# Patient Record
Sex: MALE | Race: WHITE | NOT HISPANIC OR LATINO | Employment: FULL TIME | URBAN - METROPOLITAN AREA
[De-identification: names, ages, dates, MRNs, and addresses within clinical notes are randomized per-mention and may not be internally consistent; named-entity substitution may affect disease eponyms.]

---

## 2023-09-23 PROCEDURE — 99285 EMERGENCY DEPT VISIT HI MDM: CPT

## 2023-09-24 ENCOUNTER — HOSPITAL ENCOUNTER (INPATIENT)
Facility: HOSPITAL | Age: 28
LOS: 2 days | Discharge: HOME | DRG: 274 | End: 2023-09-26
Attending: EMERGENCY MEDICINE | Admitting: INTERNAL MEDICINE
Payer: COMMERCIAL

## 2023-09-24 ENCOUNTER — APPOINTMENT (OUTPATIENT)
Dept: GENERAL RADIOLOGY | Facility: HOSPITAL | Age: 28
DRG: 274 | End: 2023-09-24
Payer: COMMERCIAL

## 2023-09-24 ENCOUNTER — APPOINTMENT (OUTPATIENT)
Dept: CARDIOLOGY | Facility: HOSPITAL | Age: 28
DRG: 274 | End: 2023-09-24
Payer: COMMERCIAL

## 2023-09-24 DIAGNOSIS — I45.6 WOLFF-PARKINSON-WHITE SYNDROME: ICD-10-CM

## 2023-09-24 DIAGNOSIS — I47.20 V-TACH: Primary | ICD-10-CM

## 2023-09-24 DIAGNOSIS — I48.91 ATRIAL FIBRILLATION WITH RVR: ICD-10-CM

## 2023-09-24 LAB
ALBUMIN SERPL-MCNC: 4.2 G/DL (ref 3.5–5.2)
ALBUMIN SERPL-MCNC: 4.9 G/DL (ref 3.5–5.2)
ALBUMIN/GLOB SERPL: 2 G/DL
ALBUMIN/GLOB SERPL: 2.1 G/DL
ALP SERPL-CCNC: 59 U/L (ref 39–117)
ALP SERPL-CCNC: 77 U/L (ref 39–117)
ALT SERPL W P-5'-P-CCNC: 17 U/L (ref 1–41)
ALT SERPL W P-5'-P-CCNC: 17 U/L (ref 1–41)
ANION GAP SERPL CALCULATED.3IONS-SCNC: 12 MMOL/L (ref 5–15)
ANION GAP SERPL CALCULATED.3IONS-SCNC: 15 MMOL/L (ref 5–15)
ANION GAP SERPL CALCULATED.3IONS-SCNC: 17 MMOL/L (ref 10–20)
APTT PPP: 27.2 SECONDS (ref 61–76.5)
AST SERPL-CCNC: 21 U/L (ref 1–40)
AST SERPL-CCNC: 23 U/L (ref 1–40)
BASOPHILS # BLD AUTO: 0 10*3/MM3 (ref 0–0.2)
BASOPHILS # BLD AUTO: 0.1 10*3/MM3 (ref 0–0.2)
BASOPHILS NFR BLD AUTO: 0.5 % (ref 0–1.5)
BASOPHILS NFR BLD AUTO: 1.3 % (ref 0–1.5)
BH CV ECHO MEAS - AO MAX PG: 9.6 MMHG
BH CV ECHO MEAS - AO MEAN PG: 5 MMHG
BH CV ECHO MEAS - AO V2 MAX: 155 CM/SEC
BH CV ECHO MEAS - AO V2 VTI: 22.8 CM
BH CV ECHO MEAS - AVA(I,D): 2.8 CM2
BH CV ECHO MEAS - EDV(CUBED): 79.5 ML
BH CV ECHO MEAS - EDV(MOD-SP4): 77.6 ML
BH CV ECHO MEAS - EF(MOD-BP): 43 %
BH CV ECHO MEAS - EF(MOD-SP4): 43.2 %
BH CV ECHO MEAS - ESV(CUBED): 35.9 ML
BH CV ECHO MEAS - ESV(MOD-SP4): 44.1 ML
BH CV ECHO MEAS - FS: 23.3 %
BH CV ECHO MEAS - IVS/LVPW: 1 CM
BH CV ECHO MEAS - IVSD: 0.8 CM
BH CV ECHO MEAS - LA DIMENSION: 3.4 CM
BH CV ECHO MEAS - LAT PEAK E' VEL: 17 CM/SEC
BH CV ECHO MEAS - LV DIASTOLIC VOL/BSA (35-75): 31.1 CM2
BH CV ECHO MEAS - LV MASS(C)D: 105.3 GRAMS
BH CV ECHO MEAS - LV MAX PG: 3.9 MMHG
BH CV ECHO MEAS - LV MEAN PG: 2 MMHG
BH CV ECHO MEAS - LV SYSTOLIC VOL/BSA (12-30): 17.7 CM2
BH CV ECHO MEAS - LV V1 MAX: 98.4 CM/SEC
BH CV ECHO MEAS - LV V1 VTI: 14.2 CM
BH CV ECHO MEAS - LVIDD: 4.3 CM
BH CV ECHO MEAS - LVIDS: 3.3 CM
BH CV ECHO MEAS - LVOT AREA: 4.5 CM2
BH CV ECHO MEAS - LVOT DIAM: 2.4 CM
BH CV ECHO MEAS - LVPWD: 0.8 CM
BH CV ECHO MEAS - MED PEAK E' VEL: 4.8 CM/SEC
BH CV ECHO MEAS - MV A MAX VEL: 48.7 CM/SEC
BH CV ECHO MEAS - MV DEC SLOPE: 1371 CM/SEC2
BH CV ECHO MEAS - MV DEC TIME: 0.11 SEC
BH CV ECHO MEAS - MV E MAX VEL: 82.3 CM/SEC
BH CV ECHO MEAS - MV E/A: 1.69
BH CV ECHO MEAS - MV MAX PG: 4.4 MMHG
BH CV ECHO MEAS - MV MEAN PG: 2 MMHG
BH CV ECHO MEAS - MV P1/2T: 22.4 MSEC
BH CV ECHO MEAS - MV V2 VTI: 12.2 CM
BH CV ECHO MEAS - MVA(P1/2T): 9.8 CM2
BH CV ECHO MEAS - MVA(VTI): 5.3 CM2
BH CV ECHO MEAS - PA V2 MAX: 133 CM/SEC
BH CV ECHO MEAS - RV MAX PG: 3.1 MMHG
BH CV ECHO MEAS - RV V1 MAX: 88.6 CM/SEC
BH CV ECHO MEAS - RV V1 VTI: 18.1 CM
BH CV ECHO MEAS - RVDD: 3.4 CM
BH CV ECHO MEAS - SI(MOD-SP4): 13.4 ML/M2
BH CV ECHO MEAS - SV(LVOT): 64.2 ML
BH CV ECHO MEAS - SV(MOD-SP4): 33.5 ML
BH CV ECHO MEAS - TAPSE (>1.6): 1.41 CM
BH CV ECHO MEASUREMENTS AVERAGE E/E' RATIO: 7.55
BH CV XLRA - TDI S': 16.9 CM/SEC
BILIRUB SERPL-MCNC: 0.4 MG/DL (ref 0–1.2)
BILIRUB SERPL-MCNC: 1.2 MG/DL (ref 0–1.2)
BUN BLDA-MCNC: 23 MG/DL (ref 8–26)
BUN SERPL-MCNC: 15 MG/DL (ref 6–20)
BUN SERPL-MCNC: 17 MG/DL (ref 6–20)
BUN/CREAT SERPL: 13.8 (ref 7–25)
BUN/CREAT SERPL: 17 (ref 7–25)
CA-I BLDA-SCNC: 1.1 MMOL/L (ref 1.12–1.32)
CALCIUM SPEC-SCNC: 9.1 MG/DL (ref 8.6–10.5)
CALCIUM SPEC-SCNC: 9.6 MG/DL (ref 8.6–10.5)
CHLORIDE BLDA-SCNC: 105 MMOL/L (ref 98–109)
CHLORIDE SERPL-SCNC: 105 MMOL/L (ref 98–107)
CHLORIDE SERPL-SCNC: 109 MMOL/L (ref 98–107)
CHOLEST SERPL-MCNC: 157 MG/DL (ref 0–200)
CO2 BLDA-SCNC: 24 MMOL/L (ref 24–29)
CO2 SERPL-SCNC: 20 MMOL/L (ref 22–29)
CO2 SERPL-SCNC: 22 MMOL/L (ref 22–29)
CREAT BLDA-MCNC: 1.6 MG/DL (ref 0.6–1.3)
CREAT SERPL-MCNC: 0.88 MG/DL (ref 0.76–1.27)
CREAT SERPL-MCNC: 1.23 MG/DL (ref 0.76–1.27)
DEPRECATED RDW RBC AUTO: 42.9 FL (ref 37–54)
DEPRECATED RDW RBC AUTO: 43.3 FL (ref 37–54)
EGFRCR SERPLBLD CKD-EPI 2021: 119.4 ML/MIN/1.73
EGFRCR SERPLBLD CKD-EPI 2021: 59.4 ML/MIN/1.73
EGFRCR SERPLBLD CKD-EPI 2021: 81.5 ML/MIN/1.73
EOSINOPHIL # BLD AUTO: 0 10*3/MM3 (ref 0–0.4)
EOSINOPHIL # BLD AUTO: 0.1 10*3/MM3 (ref 0–0.4)
EOSINOPHIL NFR BLD AUTO: 0.7 % (ref 0.3–6.2)
EOSINOPHIL NFR BLD AUTO: 0.8 % (ref 0.3–6.2)
ERYTHROCYTE [DISTWIDTH] IN BLOOD BY AUTOMATED COUNT: 13.1 % (ref 12.3–15.4)
ERYTHROCYTE [DISTWIDTH] IN BLOOD BY AUTOMATED COUNT: 13.2 % (ref 12.3–15.4)
ETHANOL UR QL: 0.12 %
GLOBULIN UR ELPH-MCNC: 2.1 GM/DL
GLOBULIN UR ELPH-MCNC: 2.3 GM/DL
GLUCOSE BLDC GLUCOMTR-MCNC: 121 MG/DL (ref 70–105)
GLUCOSE SERPL-MCNC: 101 MG/DL (ref 65–99)
GLUCOSE SERPL-MCNC: 110 MG/DL (ref 65–99)
HCT VFR BLD AUTO: 44.4 % (ref 37.5–51)
HCT VFR BLD AUTO: 46.7 % (ref 37.5–51)
HCT VFR BLDA CALC: 46 % (ref 38–51)
HDLC SERPL-MCNC: 41 MG/DL (ref 40–60)
HGB BLD-MCNC: 15.6 G/DL (ref 13–17.7)
HGB BLD-MCNC: 16.7 G/DL (ref 13–17.7)
HGB BLDA-MCNC: 15.6 G/DL (ref 12–17)
INR PPP: 0.96 (ref 0.93–1.1)
LDLC SERPL CALC-MCNC: 97 MG/DL (ref 0–100)
LDLC/HDLC SERPL: 2.32 {RATIO}
LEFT ATRIUM VOLUME INDEX: 22 ML/M2
LYMPHOCYTES # BLD AUTO: 2.1 10*3/MM3 (ref 0.7–3.1)
LYMPHOCYTES # BLD AUTO: 2.1 10*3/MM3 (ref 0.7–3.1)
LYMPHOCYTES NFR BLD AUTO: 25.2 % (ref 19.6–45.3)
LYMPHOCYTES NFR BLD AUTO: 31 % (ref 19.6–45.3)
MAGNESIUM SERPL-MCNC: 2 MG/DL (ref 1.6–2.6)
MAGNESIUM SERPL-MCNC: 2.3 MG/DL (ref 1.6–2.6)
MCH RBC QN AUTO: 31.4 PG (ref 26.6–33)
MCH RBC QN AUTO: 32 PG (ref 26.6–33)
MCHC RBC AUTO-ENTMCNC: 35.1 G/DL (ref 31.5–35.7)
MCHC RBC AUTO-ENTMCNC: 35.9 G/DL (ref 31.5–35.7)
MCV RBC AUTO: 89.1 FL (ref 79–97)
MCV RBC AUTO: 89.4 FL (ref 79–97)
MONOCYTES # BLD AUTO: 0.5 10*3/MM3 (ref 0.1–0.9)
MONOCYTES # BLD AUTO: 0.9 10*3/MM3 (ref 0.1–0.9)
MONOCYTES NFR BLD AUTO: 10.4 % (ref 5–12)
MONOCYTES NFR BLD AUTO: 7.8 % (ref 5–12)
NEUTROPHILS NFR BLD AUTO: 4.1 10*3/MM3 (ref 1.7–7)
NEUTROPHILS NFR BLD AUTO: 5.3 10*3/MM3 (ref 1.7–7)
NEUTROPHILS NFR BLD AUTO: 60 % (ref 42.7–76)
NEUTROPHILS NFR BLD AUTO: 62.3 % (ref 42.7–76)
NRBC BLD AUTO-RTO: 0.1 /100 WBC (ref 0–0.2)
NRBC BLD AUTO-RTO: 0.1 /100 WBC (ref 0–0.2)
PLATELET # BLD AUTO: 213 10*3/MM3 (ref 140–450)
PLATELET # BLD AUTO: 253 10*3/MM3 (ref 140–450)
PMV BLD AUTO: 7.7 FL (ref 6–12)
PMV BLD AUTO: 7.8 FL (ref 6–12)
POTASSIUM BLDA-SCNC: 4.3 MMOL/L (ref 3.5–4.9)
POTASSIUM SERPL-SCNC: 4 MMOL/L (ref 3.5–5.2)
POTASSIUM SERPL-SCNC: 4.3 MMOL/L (ref 3.5–5.2)
PROT SERPL-MCNC: 6.3 G/DL (ref 6–8.5)
PROT SERPL-MCNC: 7.2 G/DL (ref 6–8.5)
PROTHROMBIN TIME: 10.3 SECONDS (ref 9.6–11.7)
QT INTERVAL: 308 MS
QT INTERVAL: 344 MS
QT INTERVAL: 399 MS
QTC INTERVAL: 476 MS
QTC INTERVAL: 546 MS
QTC INTERVAL: 562 MS
RBC # BLD AUTO: 4.97 10*6/MM3 (ref 4.14–5.8)
RBC # BLD AUTO: 5.24 10*6/MM3 (ref 4.14–5.8)
SINUS: 3.8 CM
SODIUM BLD-SCNC: 141 MMOL/L (ref 138–146)
SODIUM SERPL-SCNC: 141 MMOL/L (ref 136–145)
SODIUM SERPL-SCNC: 142 MMOL/L (ref 136–145)
TRIGL SERPL-MCNC: 105 MG/DL (ref 0–150)
TROPONIN T SERPL HS-MCNC: <6 NG/L
TSH SERPL DL<=0.05 MIU/L-ACNC: 2.08 UIU/ML (ref 0.27–4.2)
VLDLC SERPL-MCNC: 19 MG/DL (ref 5–40)
WBC NRBC COR # BLD: 6.8 10*3/MM3 (ref 3.4–10.8)
WBC NRBC COR # BLD: 8.4 10*3/MM3 (ref 3.4–10.8)

## 2023-09-24 PROCEDURE — 25010000002 FENTANYL CITRATE (PF) 50 MCG/ML SOLUTION: Performed by: NURSE PRACTITIONER

## 2023-09-24 PROCEDURE — 93005 ELECTROCARDIOGRAM TRACING: CPT | Performed by: EMERGENCY MEDICINE

## 2023-09-24 PROCEDURE — 93005 ELECTROCARDIOGRAM TRACING: CPT

## 2023-09-24 PROCEDURE — 85014 HEMATOCRIT: CPT

## 2023-09-24 PROCEDURE — 80047 BASIC METABLC PNL IONIZED CA: CPT

## 2023-09-24 PROCEDURE — 5A2204Z RESTORATION OF CARDIAC RHYTHM, SINGLE: ICD-10-PCS | Performed by: INTERNAL MEDICINE

## 2023-09-24 PROCEDURE — 80050 GENERAL HEALTH PANEL: CPT | Performed by: STUDENT IN AN ORGANIZED HEALTH CARE EDUCATION/TRAINING PROGRAM

## 2023-09-24 PROCEDURE — 25010000002 AMIODARONE IN DEXTROSE 5% 360-4.14 MG/200ML-% SOLUTION: Performed by: EMERGENCY MEDICINE

## 2023-09-24 PROCEDURE — 25010000002 PROCAINAMIDE PER 1 G: Performed by: EMERGENCY MEDICINE

## 2023-09-24 PROCEDURE — 80053 COMPREHEN METABOLIC PANEL: CPT | Performed by: EMERGENCY MEDICINE

## 2023-09-24 PROCEDURE — 85730 THROMBOPLASTIN TIME PARTIAL: CPT | Performed by: EMERGENCY MEDICINE

## 2023-09-24 PROCEDURE — 25010000002 FENTANYL CITRATE (PF) 50 MCG/ML SOLUTION

## 2023-09-24 PROCEDURE — 83735 ASSAY OF MAGNESIUM: CPT | Performed by: STUDENT IN AN ORGANIZED HEALTH CARE EDUCATION/TRAINING PROGRAM

## 2023-09-24 PROCEDURE — 25010000002 MIDAZOLAM PER 1 MG

## 2023-09-24 PROCEDURE — 71045 X-RAY EXAM CHEST 1 VIEW: CPT

## 2023-09-24 PROCEDURE — 84484 ASSAY OF TROPONIN QUANT: CPT | Performed by: EMERGENCY MEDICINE

## 2023-09-24 PROCEDURE — 85610 PROTHROMBIN TIME: CPT | Performed by: EMERGENCY MEDICINE

## 2023-09-24 PROCEDURE — 83735 ASSAY OF MAGNESIUM: CPT | Performed by: EMERGENCY MEDICINE

## 2023-09-24 PROCEDURE — 25010000002 AMIODARONE IN DEXTROSE 5% 150-4.21 MG/100ML-% SOLUTION

## 2023-09-24 PROCEDURE — 25010000002 AMIODARONE IN DEXTROSE 5% 150-4.21 MG/100ML-% SOLUTION: Performed by: INTERNAL MEDICINE

## 2023-09-24 PROCEDURE — 93005 ELECTROCARDIOGRAM TRACING: CPT | Performed by: INTERNAL MEDICINE

## 2023-09-24 PROCEDURE — 85025 COMPLETE CBC W/AUTO DIFF WBC: CPT | Performed by: EMERGENCY MEDICINE

## 2023-09-24 PROCEDURE — 93306 TTE W/DOPPLER COMPLETE: CPT | Performed by: INTERNAL MEDICINE

## 2023-09-24 PROCEDURE — 82077 ASSAY SPEC XCP UR&BREATH IA: CPT | Performed by: EMERGENCY MEDICINE

## 2023-09-24 PROCEDURE — 25010000002 AMIODARONE IN DEXTROSE 5% 360-4.14 MG/200ML-% SOLUTION: Performed by: INTERNAL MEDICINE

## 2023-09-24 PROCEDURE — 25010000002 MIDAZOLAM PER 1 MG: Performed by: NURSE PRACTITIONER

## 2023-09-24 PROCEDURE — 93306 TTE W/DOPPLER COMPLETE: CPT

## 2023-09-24 PROCEDURE — 36415 COLL VENOUS BLD VENIPUNCTURE: CPT | Performed by: STUDENT IN AN ORGANIZED HEALTH CARE EDUCATION/TRAINING PROGRAM

## 2023-09-24 PROCEDURE — 80061 LIPID PANEL: CPT | Performed by: STUDENT IN AN ORGANIZED HEALTH CARE EDUCATION/TRAINING PROGRAM

## 2023-09-24 PROCEDURE — 25010000002 THIAMINE HCL 200 MG/2ML SOLUTION 2 ML VIAL: Performed by: EMERGENCY MEDICINE

## 2023-09-24 RX ORDER — SODIUM CHLORIDE 0.9 % (FLUSH) 0.9 %
10 SYRINGE (ML) INJECTION EVERY 12 HOURS SCHEDULED
Status: DISCONTINUED | OUTPATIENT
Start: 2023-09-24 | End: 2023-09-26 | Stop reason: HOSPADM

## 2023-09-24 RX ORDER — FENTANYL CITRATE 50 UG/ML
50 INJECTION, SOLUTION INTRAMUSCULAR; INTRAVENOUS
Status: COMPLETED | OUTPATIENT
Start: 2023-09-24 | End: 2023-09-24

## 2023-09-24 RX ORDER — FLUMAZENIL 0.1 MG/ML
0.2 INJECTION INTRAVENOUS
Status: DISCONTINUED | OUTPATIENT
Start: 2023-09-24 | End: 2023-09-24

## 2023-09-24 RX ORDER — ACETAMINOPHEN 650 MG/1
650 SUPPOSITORY RECTAL EVERY 4 HOURS PRN
Status: DISCONTINUED | OUTPATIENT
Start: 2023-09-24 | End: 2023-09-26 | Stop reason: HOSPADM

## 2023-09-24 RX ORDER — FENTANYL CITRATE 50 UG/ML
25 INJECTION, SOLUTION INTRAMUSCULAR; INTRAVENOUS
Status: DISCONTINUED | OUTPATIENT
Start: 2023-09-24 | End: 2023-09-24

## 2023-09-24 RX ORDER — NITROGLYCERIN 0.4 MG/1
0.4 TABLET SUBLINGUAL
Status: DISCONTINUED | OUTPATIENT
Start: 2023-09-24 | End: 2023-09-26 | Stop reason: HOSPADM

## 2023-09-24 RX ORDER — MIDAZOLAM HYDROCHLORIDE 1 MG/ML
INJECTION INTRAMUSCULAR; INTRAVENOUS
Status: COMPLETED
Start: 2023-09-24 | End: 2023-09-24

## 2023-09-24 RX ORDER — FENTANYL CITRATE 50 UG/ML
INJECTION, SOLUTION INTRAMUSCULAR; INTRAVENOUS
Status: COMPLETED
Start: 2023-09-24 | End: 2023-09-24

## 2023-09-24 RX ORDER — BISACODYL 5 MG/1
5 TABLET, DELAYED RELEASE ORAL DAILY PRN
Status: DISCONTINUED | OUTPATIENT
Start: 2023-09-24 | End: 2023-09-24

## 2023-09-24 RX ORDER — MIDAZOLAM HYDROCHLORIDE 1 MG/ML
2 INJECTION INTRAMUSCULAR; INTRAVENOUS ONCE
Status: COMPLETED | OUTPATIENT
Start: 2023-09-24 | End: 2023-09-24

## 2023-09-24 RX ORDER — METOPROLOL TARTRATE 5 MG/5ML
5 INJECTION INTRAVENOUS ONCE
Status: COMPLETED | OUTPATIENT
Start: 2023-09-24 | End: 2023-09-24

## 2023-09-24 RX ORDER — ONDANSETRON 2 MG/ML
4 INJECTION INTRAMUSCULAR; INTRAVENOUS EVERY 6 HOURS PRN
Status: DISCONTINUED | OUTPATIENT
Start: 2023-09-24 | End: 2023-09-26 | Stop reason: HOSPADM

## 2023-09-24 RX ORDER — AMOXICILLIN 250 MG
2 CAPSULE ORAL 2 TIMES DAILY
Status: DISCONTINUED | OUTPATIENT
Start: 2023-09-24 | End: 2023-09-24

## 2023-09-24 RX ORDER — ACETAMINOPHEN 325 MG/1
650 TABLET ORAL EVERY 4 HOURS PRN
Status: DISCONTINUED | OUTPATIENT
Start: 2023-09-24 | End: 2023-09-26 | Stop reason: HOSPADM

## 2023-09-24 RX ORDER — SODIUM CHLORIDE 9 MG/ML
40 INJECTION, SOLUTION INTRAVENOUS AS NEEDED
Status: DISCONTINUED | OUTPATIENT
Start: 2023-09-24 | End: 2023-09-26 | Stop reason: HOSPADM

## 2023-09-24 RX ORDER — MIDAZOLAM HYDROCHLORIDE 1 MG/ML
2 INJECTION INTRAMUSCULAR; INTRAVENOUS
Status: COMPLETED | OUTPATIENT
Start: 2023-09-24 | End: 2023-09-24

## 2023-09-24 RX ORDER — NALOXONE HCL 0.4 MG/ML
0.4 VIAL (ML) INJECTION AS NEEDED
Status: DISCONTINUED | OUTPATIENT
Start: 2023-09-24 | End: 2023-09-26 | Stop reason: HOSPADM

## 2023-09-24 RX ORDER — SODIUM CHLORIDE 0.9 % (FLUSH) 0.9 %
10 SYRINGE (ML) INJECTION AS NEEDED
Status: DISCONTINUED | OUTPATIENT
Start: 2023-09-24 | End: 2023-09-26 | Stop reason: HOSPADM

## 2023-09-24 RX ORDER — PANTOPRAZOLE SODIUM 40 MG/10ML
40 INJECTION, POWDER, LYOPHILIZED, FOR SOLUTION INTRAVENOUS
Status: DISCONTINUED | OUTPATIENT
Start: 2023-09-24 | End: 2023-09-24

## 2023-09-24 RX ORDER — ONDANSETRON 4 MG/1
4 TABLET, FILM COATED ORAL EVERY 6 HOURS PRN
Status: DISCONTINUED | OUTPATIENT
Start: 2023-09-24 | End: 2023-09-26 | Stop reason: HOSPADM

## 2023-09-24 RX ORDER — FENTANYL CITRATE 50 UG/ML
50 INJECTION, SOLUTION INTRAMUSCULAR; INTRAVENOUS ONCE
Status: COMPLETED | OUTPATIENT
Start: 2023-09-24 | End: 2023-09-24

## 2023-09-24 RX ORDER — POLYETHYLENE GLYCOL 3350 17 G/17G
17 POWDER, FOR SOLUTION ORAL DAILY PRN
Status: DISCONTINUED | OUTPATIENT
Start: 2023-09-24 | End: 2023-09-24

## 2023-09-24 RX ORDER — MIDAZOLAM HYDROCHLORIDE 1 MG/ML
1 INJECTION INTRAMUSCULAR; INTRAVENOUS
Status: DISCONTINUED | OUTPATIENT
Start: 2023-09-24 | End: 2023-09-24

## 2023-09-24 RX ORDER — BISACODYL 10 MG
10 SUPPOSITORY, RECTAL RECTAL DAILY PRN
Status: DISCONTINUED | OUTPATIENT
Start: 2023-09-24 | End: 2023-09-24

## 2023-09-24 RX ADMIN — FENTANYL CITRATE 50 MCG: 50 INJECTION, SOLUTION INTRAMUSCULAR; INTRAVENOUS at 12:31

## 2023-09-24 RX ADMIN — Medication 10 ML: at 04:55

## 2023-09-24 RX ADMIN — PROCAINAMIDE HYDROCHLORIDE 1000 MG: 100 INJECTION, SOLUTION INTRAMUSCULAR; INTRAVENOUS at 01:05

## 2023-09-24 RX ADMIN — METOPROLOL TARTRATE 5 MG: 1 INJECTION, SOLUTION INTRAVENOUS at 10:00

## 2023-09-24 RX ADMIN — METOPROLOL TARTRATE 5 MG: 1 INJECTION, SOLUTION INTRAVENOUS at 09:39

## 2023-09-24 RX ADMIN — MIDAZOLAM 2 MG: 1 INJECTION INTRAMUSCULAR; INTRAVENOUS at 12:30

## 2023-09-24 RX ADMIN — MIDAZOLAM 2 MG: 1 INJECTION INTRAMUSCULAR; INTRAVENOUS at 12:31

## 2023-09-24 RX ADMIN — Medication 10 ML: at 08:57

## 2023-09-24 RX ADMIN — PANTOPRAZOLE SODIUM 40 MG: 40 INJECTION, POWDER, LYOPHILIZED, FOR SOLUTION INTRAVENOUS at 06:29

## 2023-09-24 RX ADMIN — AMIODARONE HYDROCHLORIDE 0.5 MG/MIN: 1.8 INJECTION, SOLUTION INTRAVENOUS at 17:42

## 2023-09-24 RX ADMIN — MIDAZOLAM 2 MG: 1 INJECTION INTRAMUSCULAR; INTRAVENOUS at 12:34

## 2023-09-24 RX ADMIN — FENTANYL CITRATE 50 MCG: 50 INJECTION, SOLUTION INTRAMUSCULAR; INTRAVENOUS at 11:50

## 2023-09-24 RX ADMIN — AMIODARONE HYDROCHLORIDE 150 MG: 1.5 INJECTION, SOLUTION INTRAVENOUS at 00:18

## 2023-09-24 RX ADMIN — MIDAZOLAM 2 MG: 1 INJECTION INTRAMUSCULAR; INTRAVENOUS at 11:50

## 2023-09-24 RX ADMIN — FENTANYL CITRATE 50 MCG: 50 INJECTION, SOLUTION INTRAMUSCULAR; INTRAVENOUS at 12:32

## 2023-09-24 RX ADMIN — FENTANYL CITRATE 50 MCG: 50 INJECTION, SOLUTION INTRAMUSCULAR; INTRAVENOUS at 12:34

## 2023-09-24 RX ADMIN — FOLIC ACID 250 ML/HR: 5 INJECTION, SOLUTION INTRAMUSCULAR; INTRAVENOUS; SUBCUTANEOUS at 01:21

## 2023-09-24 RX ADMIN — AMIODARONE HYDROCHLORIDE 150 MG: 1.5 INJECTION, SOLUTION INTRAVENOUS at 12:25

## 2023-09-24 RX ADMIN — AMIODARONE HYDROCHLORIDE 150 MG: 1.5 INJECTION, SOLUTION INTRAVENOUS at 10:55

## 2023-09-24 RX ADMIN — Medication 10 ML: at 20:17

## 2023-09-24 RX ADMIN — AMIODARONE HYDROCHLORIDE 1 MG/MIN: 1.8 INJECTION, SOLUTION INTRAVENOUS at 00:28

## 2023-09-24 RX ADMIN — AMIODARONE HYDROCHLORIDE 1 MG/MIN: 1.8 INJECTION, SOLUTION INTRAVENOUS at 12:28

## 2023-09-24 RX ADMIN — DEXTROSE MONOHYDRATE 2 MG/MIN: 5 INJECTION, SOLUTION INTRAVENOUS at 02:02

## 2023-09-24 NOTE — ED PROVIDER NOTES
Subjective   History of Present Illness  29 yo male with remote hx of palpitations around 14 years of age that never cause any issues.  Patient denies any other hx but states 1 hour pta he developed fast palpitations.  Patient denies any dizziness, cp, soa, syncope.  Patient admits to etoh today at a wedding but does not normally abuse etoh, no substance abuse, no known FH of cardiac issues.    Review of Systems   Constitutional: Negative.    Respiratory: Negative.     Cardiovascular:  Positive for palpitations. Negative for chest pain.   Neurological: Negative.      No past medical history on file.    Allergies   Allergen Reactions    Azithromycin Tinnitus       No past surgical history on file.    No family history on file.    Social History     Socioeconomic History    Marital status: Single           Objective   Physical Exam  Constitutional:       Appearance: Normal appearance.   HENT:      Head: Normocephalic and atraumatic.      Mouth/Throat:      Mouth: Mucous membranes are moist.      Pharynx: Oropharynx is clear.   Eyes:      Conjunctiva/sclera: Conjunctivae normal.      Pupils: Pupils are equal, round, and reactive to light.   Cardiovascular:      Rate and Rhythm: Tachycardia present. Rhythm irregular.   Pulmonary:      Effort: Pulmonary effort is normal.      Breath sounds: Normal breath sounds.   Musculoskeletal:         General: Normal range of motion.   Skin:     General: Skin is warm and dry.      Capillary Refill: Capillary refill takes less than 2 seconds.   Neurological:      General: No focal deficit present.      Mental Status: He is alert and oriented to person, place, and time.   Psychiatric:         Mood and Affect: Mood normal.         Behavior: Behavior normal.       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 tach that has been stable throughout ED course.  Initially amiodarone was  given with no improvement, after discussion with Dr. Mullins, who reviewed EKGs, will switch to procainamide.    Patient has received procainamide bolus and is on infusion with some improvement in rhythm, still asymptomatic, no electrolyte disturbance on labs.  Case discussed with Shanice with intensivist service, will continue procainamide drip, a.m. echo, observe for any decompensation    Critical care time 45 minutes    Problems Addressed:  V-tach: complicated acute illness or injury    Amount and/or Complexity of Data Reviewed  Labs: ordered.  Radiology: ordered.  ECG/medicine tests: ordered.     Details: EKG interpretation:  Wide complex tachycardia, rate 198    Risk  Prescription drug management.  Decision regarding hospitalization.        Final diagnoses:   V-tach       ED Disposition  ED Disposition       ED Disposition   Decision to Admit    Condition   --    Comment   Level of Care: Critical Care [6]   Admitting Physician: RIANNA ERAZO [[id number]]                 No follow-up provider specified.       Medication List      No changes were made to your prescriptions during this visit.            Onesimo Yates MD  09/24/23 0232

## 2023-09-24 NOTE — PLAN OF CARE
Goal Outcome Evaluation:  Plan of Care Reviewed With: patient           Outcome Evaluation: Pt admitted to ICU for Ventricular Tachycardia. Pt AOx4 currently on 2L NC with no complaints of pain. Heart rate has varied from around from 130-214 bpm but is sustaining in the 140's. BP has been stable for the shift.

## 2023-09-24 NOTE — ED NOTES
Nursing report ED to floor  Dietre SAURABH Daugherty  28 y.o.  male    HPI:   Chief Complaint   Patient presents with    Palpitations       Admitting doctor:   Ang Malone MD    Admitting diagnosis:   The encounter diagnosis was V-tach.    Code status:   Current Code Status       Date Active Code Status Order ID Comments User Context       Not on file            Allergies:   Azithromycin    Isolation:  No active isolations     Fall Risk:  Fall Risk Assessment was completed, and patient is at moderate risk for falls.   Predictive Model Details         1 (Low) Factor Value    Calculated 9/24/2023 02:06 Musculoskeletal Assessment WDL    Risk of Fall Model Active Peripheral IV Present     Imaging order in this encounter Present     Magnesium 2.3 mg/dL     Drug Use Not Asked     Age 28     Respiratory Rate 18     Skin Assessment WDL     Number of Distinct Medication Classes administered 5     Diastolic BP 70     Brady Scale not on file     Financial Class Private Insurance     Peripheral Vascular Assessment WDL     Cardiac Assessment X     Creatinine 1.6 mg/dL     Tobacco Use Not Asked     Clinically Relevant Sex Not Female     Gastrointestinal Assessment WDL     Potassium 4.3 mmol/L     Total Bilirubin 0.4 mg/dL     Days after Admission 0.082     Calcium 9.6 mg/dL     Chloride 105 mmol/L     Albumin 4.9 g/dL     ALT 17 U/L         Weight:       09/23/23 2358   Weight: 124 kg (274 lb 0.5 oz)       Intake and Output  No intake or output data in the 24 hours ending 09/24/23 0238    Diet:        Most recent vitals:   Vitals:    09/24/23 0201 09/24/23 0216 09/24/23 0231 09/24/23 0232   BP: 109/50 99/57 97/68    Pulse: (!) 131 (!) 147 (!) 160    Resp:       Temp:       SpO2: 95% 96%  96%   Weight:       Height:           Active LDAs/IV Access:   Lines, Drains & Airways       Active LDAs       Name Placement date Placement time Site Days    Peripheral IV 09/24/23 0017 Right Antecubital 09/24/23  0017  Antecubital  less than 1     Peripheral IV 09/24/23 0025 Left Antecubital 09/24/23  0025  Antecubital  less than 1                    Skin Condition:   Skin Assessments (last day)       Date/Time Skin WDL    09/24/23 00:26:36 WDL             Labs (abnormal labs have a star):   Labs Reviewed   COMPREHENSIVE METABOLIC PANEL - Abnormal; Notable for the following components:       Result Value    Glucose 110 (*)     All other components within normal limits    Narrative:     GFR Normal >60  Chronic Kidney Disease <60  Kidney Failure <15     APTT - Abnormal; Notable for the following components:    PTT 27.2 (*)     All other components within normal limits   CBC WITH AUTO DIFFERENTIAL - Abnormal; Notable for the following components:    MCHC 35.9 (*)     All other components within normal limits   POCT CHEM 8 - Abnormal; Notable for the following components:    Glucose 121 (*)     Creatinine 1.60 (*)     Ionized Calcium 1.10 (*)     eGFR 59.4 (*)     All other components within normal limits   SINGLE HSTROPONIN T - Normal    Narrative:     High Sensitive Troponin T Reference Range:  <10.0 ng/L- Negative Female for AMI  <15.0 ng/L- Negative Male for AMI  >=10 - Abnormal Female indicating possible myocardial injury.  >=15 - Abnormal Male indicating possible myocardial injury.   Clinicians would have to utilize clinical acumen, EKG, Troponin, and serial changes to determine if it is an Acute Myocardial Infarction or myocardial injury due to an underlying chronic condition.        TSH - Normal   MAGNESIUM - Normal   PROTIME-INR - Normal   ETHANOL    Narrative:     Plasma Ethanol Clinical Symptoms:    ETOH (%)               Clinical Symptom  .01-.05              No apparent influence  .03-.12              Euphoria, Diminished judgment and attention   .09-.25              Impaired comprehension, Muscle incoordination  .18-.30              Confusion, Staggered gait, Slurred speech  .25-.40              Markedly decreased response to stimuli, unable to  stand or                        walk, vomitting, sleep or stupor  .35-.50              Comatose, Anesthesia, Subnormal body temperature       URINE DRUG SCREEN   CBC AND DIFFERENTIAL    Narrative:     The following orders were created for panel order CBC & Differential.  Procedure                               Abnormality         Status                     ---------                               -----------         ------                     CBC Auto Differential[[id number]]        Abnormal            Final result                 Please view results for these tests on the individual orders.       LOC: Person, Place, Time, and Situation    Telemetry:  Critical Care    Cardiac Monitoring Ordered: yes    EKG:   ECG 12 Lead Other; Palpitations   Preliminary Result   HEART RATE= 198  bpm   RR Interval= 318  ms   [place] Interval=   ms   P Horizontal Axis=   deg   P Front Axis=   deg   QRSD Interval= 157  ms   QT Interval= 308  ms   QTcB= 546  ms   QRS Axis= -35  deg   T Wave Axis= 125  deg   - ABNORMAL ECG -   Extreme tachycardia with wide complex, no further rhythm analysis    attempted   When compared with ECG of 27-Dec-2013 15:45:44,   Significant change in rhythm: previously sinus   Significant repolarization change   Electronically Signed By:    Date and Time of Study: 2023-09-24 00:06:11          Medications Given in the ED:   Medications   procainamide (PRONESTYL) 2,000 mg in dextrose (D5W) 5 % 500 mL IVPB (4 mg/min Intravenous Rate/Dose Change 9/24/23 0232)   amiodarone 150 mg in 100 mL D5W (loading dose) (0 mg Intravenous Stopped 9/24/23 0028)   thiamine (B-1) 100 mg, folic acid 1 mg in sodium chloride 0.9 % 1,000 mL infusion (250 mL/hr Intravenous New Bag 9/24/23 0121)   procainamide (PRONESTYL) 1,000 mg in dextrose (D5W) 5 % 50 mL IVPB (0 mg Intravenous Stopped 9/24/23 0204)       Imaging results:  XR Chest 1 View    Result Date: 9/24/2023  Impression: No acute cardiopulmonary process. Electronically Signed: Cyn  MD Gloria  9/24/2023 12:52 AM EDT  Workstation ID: [id number]     Social issues:   Social History     Socioeconomic History    Marital status: Single       NIH Stroke Scale:  Interval: (not recorded)  1a. Level of Consciousness: (not recorded)  1b. LOC Questions: (not recorded)  1c. LOC Commands: (not recorded)  2. Best Gaze: (not recorded)  3. Visual: (not recorded)  4. Facial Palsy: (not recorded)  5a. Motor Arm, Left: (not recorded)  5b. Motor Arm, Right: (not recorded)  6a. Motor Leg, Left: (not recorded)  6b. Motor Leg, Right: (not recorded)  7. Limb Ataxia: (not recorded)  8. Sensory: (not recorded)  9. Best Language: (not recorded)  10. Dysarthria: (not recorded)  11. Extinction and Inattention (formerly Neglect): (not recorded)    Total (NIH Stroke Scale): (not recorded)     Additional notable assessment information:na     Nursing report ED to floor:  Report called to VITO Arnold RN   09/24/23 02:38 EDT

## 2023-09-24 NOTE — NURSING NOTE
"Cardioversion done at bedside by MD Mullins. Patient now NSR with no complaints at this time. Patient states \"I feel much better now, feel like my self\"   "

## 2023-09-24 NOTE — NURSING NOTE
Patient complaining of being lightheaded and dizzy now. MD Mullins paged due to patient having symptoms. Per MD Mullins to get EKG and he will reevaluate.

## 2023-09-24 NOTE — PLAN OF CARE
Goal Outcome Evaluation:    Patients heart rate still in vtach running 140s-180s. MD Malone aware. MD Mullins aware as well. Patient maxed on procainamide gtt. Echo done this morning. Patient asymptomatic. No further actions or orders at this time

## 2023-09-24 NOTE — PLAN OF CARE
Goal Outcome Evaluation:    Patient on room air. Amio gtt running. Urine output good. Patient has had no bowel movements. Cardioversion at bedside. Patient now in NSR. Patient made PCU downgrade. Patient alert and oriented x4. Patient remains NSR with stable vitals.

## 2023-09-24 NOTE — NURSING NOTE
5 mg of lopressor given x2 per MD Mullins at bedside. Amio 150mg bolus given once as well. Zero change. Per MD Mullins, wanting to do cardioversion. Consent signed by patient.

## 2023-09-24 NOTE — H&P
Critical Care History and Physical     Dieter Daugherty : 1995 MRN:[id number] LOS:0 ROOM: [facility]/     Reason for admission: Ventricular tachycardia     Assessment / Plan     Tachyarrhythmia    -- Likely Mark-Parkinson-White  -Magnesium level 2.3  -Echo  -Continue procainamide infusion  -Consider cardioversion  -Cardiology consulted        Code Status (Patient has no pulse and is not breathing): CPR (Attempt to Resuscitate)  Medical Interventions (Patient has pulse or is breathing): Full Support       Nutrition:   NPO Diet NPO Type: Strict NPO     DVT prophylaxis:  Mechanical DVT prophylaxis orders are present.     History of Present illness     Dieter Daugherty is a 28 y.o. male with no significant PMH presented to the hospital for palpitations, and was admitted with a principal diagnosis of Ventricular tachycardia.  Patient states he was at a wedding earlier today and had a few alcoholic beverages.  He states once he arrived home after the party he noticed long periods of palpitations.  He states when he was approximately 14 years old that he had a similar problem where he was told he had irregular heart rhythms.  He states he does not remember an actual name of the condition and the palpitations resolved so he did not continue to follow-up.  He states occasionally since then he would have intermittent episodes of palpitations but denies any chest pain.  At the time my assessment, patient denies chest pain, shortness of breath, syncope, or lightheadedness.  He states he can feel the palpitations when his heart rate gets approximately 200 bpm.  Of note, on 3/25/2014 there is a [facility] office visit note discussing Mark-Parkinson-White. I admitted patient to the intensive care unit for further evaluation and treatment.      ACP: Patient wishes to be full code with full intervention; his mother is his decision-maker if he is unable.    Patient was seen and examined on 23 at 04:45 EDT  .    Subjective / Review of systems     Review of Systems   Constitutional:  Negative for diaphoresis and fatigue.   HENT:  Negative for congestion and sore throat.    Eyes:  Negative for pain and redness.   Respiratory:  Negative for cough and shortness of breath.    Cardiovascular:  Positive for palpitations. Negative for chest pain and leg swelling.   Gastrointestinal:  Negative for abdominal pain, nausea and vomiting.   Endocrine: Negative for polydipsia and polyphagia.   Genitourinary:  Negative for dysuria and flank pain.   Musculoskeletal:  Negative for back pain and joint swelling.   Skin:  Negative for color change and pallor.   Neurological:  Negative for dizziness and seizures.   Psychiatric/Behavioral:  Negative for behavioral problems and confusion.       Past Medical/Surgical/Social/Family History & Allergies     History reviewed. No pertinent past medical history.   History reviewed. No pertinent surgical history.   Social History     Socioeconomic History    Marital status: Single   Tobacco Use    Smoking status: Never    Smokeless tobacco: Never   Vaping Use    Vaping Use: Never used   Substance and Sexual Activity    Alcohol use: Never    Drug use: Never    Sexual activity: Defer      History reviewed. No pertinent family history.   Allergies   Allergen Reactions    Azithromycin Tinnitus      Social Determinants of Health     Tobacco Use: Low Risk     Smoking Tobacco Use: Never    Smokeless Tobacco Use: Never    Passive Exposure: Not on file   Alcohol Use: Not At Risk    Frequency of Alcohol Consumption: Monthly or less    Average Number of Drinks: 1 or 2    Frequency of Binge Drinking: Less than monthly   Financial Resource Strain: Not on file   Food Insecurity: Not on file   Transportation Needs: Not on file   Physical Activity: Not on file   Stress: Not on file   Social Connections: Not on file   Intimate Partner Violence: Not on file   Depression: Not on file   Housing Stability: Not on file         Home Medications     Prior to Admission medications    Not on File        Objective / Physical Exam     Vital signs:  Temp: 97.9 °F (36.6 °C)  BP: 92/71  Heart Rate: (!) 186  Resp: 21  SpO2: 94 %  Weight: 124 kg (274 lb 0.5 oz)    Admission Weight: Weight: 124 kg (274 lb 0.5 oz)    Physical Exam  Vitals and nursing note reviewed.   Constitutional:       Appearance: Normal appearance.   HENT:      Head: Normocephalic.      Nose: Nose normal.      Mouth/Throat:      Mouth: Mucous membranes are moist.      Pharynx: Oropharynx is clear.   Eyes:      Pupils: Pupils are equal, round, and reactive to light.   Cardiovascular:      Rate and Rhythm: Tachycardia present. Rhythm irregular.      Pulses: Normal pulses.      Heart sounds: Normal heart sounds.   Pulmonary:      Effort: Pulmonary effort is normal.      Breath sounds: Normal breath sounds.   Abdominal:      General: Bowel sounds are normal.      Palpations: Abdomen is soft.   Musculoskeletal:         General: Normal range of motion.      Cervical back: Normal range of motion.   Skin:     General: Skin is warm and dry.      Capillary Refill: Capillary refill takes 2 to 3 seconds.   Neurological:      Mental Status: He is alert and oriented to person, place, and time. Mental status is at baseline.   Psychiatric:         Mood and Affect: Mood normal.         Behavior: Behavior normal.         Thought Content: Thought content normal.         Judgment: Judgment normal.        Labs     Results from last 7 days   Lab Units 09/24/23  0035 09/24/23  0021   WBC 10*3/mm3  --  8.40   HEMATOCRIT %  --  46.7   HEMATOCRIT POC % 46  --    PLATELETS 10*3/mm3  --  253      Results from last 7 days   Lab Units 09/24/23  0035 09/24/23  0021   SODIUM mmol/L  --  142   POTASSIUM mmol/L  --  4.0   CHLORIDE mmol/L  --  105   CO2 mmol/L  --  22.0   BUN mg/dL  --  17   CREATININE mg/dL 1.60* 1.23        Imaging     Chest XR interpreted by me shows no acute processes.    Current  Medications     Scheduled Meds:  pantoprazole, 40 mg, Intravenous, Q AM  senna-docusate sodium, 2 tablet, Oral, BID  sodium chloride, 10 mL, Intravenous, Q12H         Continuous Infusions:  procainamide 2000 mg in dextrose 5% infusion, 1-4 mg/min, Last Rate: 4 mg/min (09/24/23 0232)       Plan discussed with RN. Reviewed all other data in the last 24 hours, including but not limited to vitals, labs, microbiology, imaging and pertinent notes from other providers.  Plan also discussed with patient at the bedside.      MICHA Pereira   Critical Care  09/24/23   04:45 EDT

## 2023-09-24 NOTE — LETTER
September 26, 2023     Patient: Dieter Daugherty   YOB: 1995   Date of Visit: 9/23/2023       To Whom It May Concern:    Dieter Daugherty has been under the care of the staff at [facility] from 9/24/2023- 9/26/2023.    It is in Doctor Noriega's medical opinion that Dieter Daugherty may return to work on Monday October 2nd, 2023.         Sincerely,    Lynne Lindquist, RN

## 2023-09-24 NOTE — PLAN OF CARE
Problem: Adult Inpatient Plan of Care  Goal: Absence of Hospital-Acquired Illness or Injury  Intervention: Prevent Infection  Recent Flowsheet Documentation  Taken 9/24/2023 0250 by Solomon Guzman RN  Infection Prevention:   environmental surveillance performed   equipment surfaces disinfected   hand hygiene promoted   personal protective equipment utilized   rest/sleep promoted   single patient room provided     Problem: Adult Inpatient Plan of Care  Goal: Optimal Comfort and Wellbeing  Intervention: Provide Person-Centered Care  Recent Flowsheet Documentation  Taken 9/24/2023 0250 by Solomon Guzman RN  Trust Relationship/Rapport:   care explained   choices provided   emotional support provided   empathic listening provided   questions answered   questions encouraged   reassurance provided   thoughts/feelings acknowledged        Goal Outcome Evaluation:  Plan of Care Reviewed With: patient           Outcome Evaluation: Pt admitted to ICU for Ventricular Tachycardia. Pt AOx4 currently on 2L NC with no complaints of pain. Heart rate has varied from around from 130-214 bpm but is sustaining in the 140's. BP has been stable for the shift.

## 2023-09-24 NOTE — LETTER
September 26, 2023     Patient: Dieter Daugherty   YOB: 1995   Date of Visit: 9/23/2023       To Whom It May Concern:    It is in Doctor Leann's medical opinion that Dieter Daugherty may return to work on Monday October 2nd, 2023.            Sincerely,    Lynne Lindquist RN

## 2023-09-25 LAB
ALBUMIN SERPL-MCNC: 3.7 G/DL (ref 3.5–5.2)
ALBUMIN/GLOB SERPL: 1.9 G/DL
ALP SERPL-CCNC: 53 U/L (ref 39–117)
ALT SERPL W P-5'-P-CCNC: 11 U/L (ref 1–41)
ANION GAP SERPL CALCULATED.3IONS-SCNC: 8 MMOL/L (ref 5–15)
AST SERPL-CCNC: 14 U/L (ref 1–40)
BASOPHILS # BLD AUTO: 0.1 10*3/MM3 (ref 0–0.2)
BASOPHILS NFR BLD AUTO: 0.9 % (ref 0–1.5)
BILIRUB SERPL-MCNC: 0.8 MG/DL (ref 0–1.2)
BUN SERPL-MCNC: 13 MG/DL (ref 6–20)
BUN/CREAT SERPL: 13.4 (ref 7–25)
CALCIUM SPEC-SCNC: 8.9 MG/DL (ref 8.6–10.5)
CHLORIDE SERPL-SCNC: 107 MMOL/L (ref 98–107)
CO2 SERPL-SCNC: 25 MMOL/L (ref 22–29)
CREAT SERPL-MCNC: 0.97 MG/DL (ref 0.76–1.27)
DEPRECATED RDW RBC AUTO: 42.9 FL (ref 37–54)
EGFRCR SERPLBLD CKD-EPI 2021: 108.4 ML/MIN/1.73
EOSINOPHIL # BLD AUTO: 0.2 10*3/MM3 (ref 0–0.4)
EOSINOPHIL NFR BLD AUTO: 3.5 % (ref 0.3–6.2)
ERYTHROCYTE [DISTWIDTH] IN BLOOD BY AUTOMATED COUNT: 13 % (ref 12.3–15.4)
GLOBULIN UR ELPH-MCNC: 2 GM/DL
GLUCOSE SERPL-MCNC: 105 MG/DL (ref 65–99)
HCT VFR BLD AUTO: 42.2 % (ref 37.5–51)
HGB BLD-MCNC: 15 G/DL (ref 13–17.7)
LYMPHOCYTES # BLD AUTO: 1.5 10*3/MM3 (ref 0.7–3.1)
LYMPHOCYTES NFR BLD AUTO: 27.1 % (ref 19.6–45.3)
MAGNESIUM SERPL-MCNC: 2 MG/DL (ref 1.6–2.6)
MCH RBC QN AUTO: 31.6 PG (ref 26.6–33)
MCHC RBC AUTO-ENTMCNC: 35.4 G/DL (ref 31.5–35.7)
MCV RBC AUTO: 89.3 FL (ref 79–97)
MONOCYTES # BLD AUTO: 0.4 10*3/MM3 (ref 0.1–0.9)
MONOCYTES NFR BLD AUTO: 7.4 % (ref 5–12)
NEUTROPHILS NFR BLD AUTO: 3.3 10*3/MM3 (ref 1.7–7)
NEUTROPHILS NFR BLD AUTO: 61.1 % (ref 42.7–76)
NRBC BLD AUTO-RTO: 0.1 /100 WBC (ref 0–0.2)
PLATELET # BLD AUTO: 181 10*3/MM3 (ref 140–450)
PMV BLD AUTO: 7.8 FL (ref 6–12)
POTASSIUM SERPL-SCNC: 4.1 MMOL/L (ref 3.5–5.2)
PROT SERPL-MCNC: 5.7 G/DL (ref 6–8.5)
QT INTERVAL: 438 MS
QTC INTERVAL: 452 MS
RBC # BLD AUTO: 4.73 10*6/MM3 (ref 4.14–5.8)
SODIUM SERPL-SCNC: 140 MMOL/L (ref 136–145)
WBC NRBC COR # BLD: 5.4 10*3/MM3 (ref 3.4–10.8)

## 2023-09-25 PROCEDURE — 36415 COLL VENOUS BLD VENIPUNCTURE: CPT | Performed by: STUDENT IN AN ORGANIZED HEALTH CARE EDUCATION/TRAINING PROGRAM

## 2023-09-25 PROCEDURE — 93005 ELECTROCARDIOGRAM TRACING: CPT | Performed by: INTERNAL MEDICINE

## 2023-09-25 PROCEDURE — 85025 COMPLETE CBC W/AUTO DIFF WBC: CPT | Performed by: STUDENT IN AN ORGANIZED HEALTH CARE EDUCATION/TRAINING PROGRAM

## 2023-09-25 PROCEDURE — 83735 ASSAY OF MAGNESIUM: CPT | Performed by: STUDENT IN AN ORGANIZED HEALTH CARE EDUCATION/TRAINING PROGRAM

## 2023-09-25 PROCEDURE — 80053 COMPREHEN METABOLIC PANEL: CPT | Performed by: STUDENT IN AN ORGANIZED HEALTH CARE EDUCATION/TRAINING PROGRAM

## 2023-09-25 PROCEDURE — 25010000002 AMIODARONE IN DEXTROSE 5% 360-4.14 MG/200ML-% SOLUTION: Performed by: INTERNAL MEDICINE

## 2023-09-25 RX ORDER — IBUPROFEN 200 MG
1 CAPSULE ORAL
Status: DISCONTINUED | OUTPATIENT
Start: 2023-09-25 | End: 2023-09-26 | Stop reason: HOSPADM

## 2023-09-25 RX ADMIN — Medication 10 ML: at 20:07

## 2023-09-25 RX ADMIN — BACITRACIN ZINC, NEOMYCIN, POLYMYXIN B 1 APPLICATION: 400; 3.5; 5 OINTMENT TOPICAL at 20:05

## 2023-09-25 RX ADMIN — AMIODARONE HYDROCHLORIDE 0.5 MG/MIN: 1.8 INJECTION, SOLUTION INTRAVENOUS at 05:46

## 2023-09-25 RX ADMIN — Medication 10 ML: at 08:27

## 2023-09-25 NOTE — CONSULTS
CC--- Mark-Parkinson-White syndrome      Sub  28-year-old pleasant patient presented with rapid palpitations after consuming excess alcohol and was found to have atrial fibrillation with antidromic conduction from Mark-Parkinson-White syndrome.  Did not respond to IV procainamide and patient was cardioverted to sinus rhythm.  Is currently comfortable denies any symptoms.  He had prior history of palpitations in the past several years ago and denies any syncope.          Past Medical History:   Diagnosis Date    Mark-Parkinson-White syndrome 03/25/2014     Past Surgical History:   Procedure Laterality Date    ELECTRICAL CARDIOVERSION  9/24/2023     Family History   Problem Relation Age of Onset    No Known Problems Mother     No Known Problems Father      Social History     Tobacco Use    Smoking status: Never    Smokeless tobacco: Never   Vaping Use    Vaping Use: Never used   Substance Use Topics    Alcohol use: Never    Drug use: Never     Review of Systems  General: Negative  for fatigue and tiredness  Eyes: No redness  Cardiovascular: No chest pain, positive for palpitations  Respiratory:   No  shortness of breath  Gastrointestinal: No nausea or vomiting, bleeding  Genitourinary: no hematuria or dysuria  Musculoskeletal: No arthralgia or myalgia  Skin: No rash  Neurologic: No numbness, tingling, syncope  Hematologic/Lymphatic: No abnormal bleeding         Physical Exam    General:      well developed, well nourished, in no acute distress.    Head:      normocephalic and atraumatic.    Eyes:      PERRL/EOM intact, conjunctivae and sclerae clear without nystagmus.    Neck:      no  thyromegaly, trachea central with normal respiratory effort  Lungs:      clear bilaterally to auscultation.    Heart:       regular rate and rhythm, S1, S2 without murmurs, rubs, or gallops  Skin:      intact without lesions or rashes.    Psych:      alert and cooperative; normal mood and affect; normal attention span and  concentration.        CBC    Results from last 7 days   Lab Units 09/25/23  0416 09/24/23  0429 09/24/23  0035 09/24/23  0021   WBC 10*3/mm3 5.40 6.80  --  8.40   HEMOGLOBIN g/dL 15.0 15.6  --  16.7   HEMOGLOBIN, POC g/dL  --   --  15.6  --    PLATELETS 10*3/mm3 181 213  --  253     BMP   Results from last 7 days   Lab Units 09/25/23  0416 09/24/23  0842 09/24/23  0035 09/24/23  0021   SODIUM mmol/L 140 141  --  142   POTASSIUM mmol/L 4.1 4.3  --  4.0   CHLORIDE mmol/L 107 109*  --  105   CO2 mmol/L 25.0 20.0*  --  22.0   BUN mg/dL 13 15  --  17   CREATININE mg/dL 0.97 0.88 1.60* 1.23   GLUCOSE mg/dL 105* 101*  --  110*   MAGNESIUM mg/dL 2.0 2.0  --  2.3     Radiology(recent) XR Chest 1 View    Result Date: 9/24/2023  Impression: No acute cardiopulmonary process. Electronically Signed: Cyn Castro MD  9/24/2023 12:52 AM EDT  Workstation ID: [id number]           Assessment plan    Mark-Parkinson-White syndrome with preexcitation  atrial fibrillation with extreme rapid conduction needing external cardioversion  Recent alcohol intoxication    IV amiodarone has been stopped  Patient to be scheduled for EP study and ablation because of high risk because of rapidly conducted AF  Patient and family educated  Risks and benefits outcomes educated  Orders for EP study and ablation placed for tomorrow        Electronically signed by Reggie Noriega MD, 09/25/23, 7:02 PM EDT.

## 2023-09-25 NOTE — CONSULTS
CARDIOLOGY CONSULT      Requesting Provider    Bhavin Berg MD      PATIENT IDENTIFICATION    Name: Dieter Daugherty  Age: 28 y.o. Sex: male : 1995  MRN: [id number]    REASON FOR CONSULTATION:  Wide-complex tachycardia    CHIEF COMPLAINT:  Palpitations    HISTORY OF PRESENT ILLNESS:   This is a 28-year-old male with no significant past medical history presented to the hospital with a complaint of palpitations.  His EKG in the emergency department demonstrated wide-complex tachycardia with a suspicion for either ventricular tachycardia or Mark-Parkinson-White.  The patient had been at a wedding earlier in the day and had a lot of alcoholic beverages.  The patient notes that he was very drunk.  In fact he went home after the wedding and vomited from all of the alcohol he consumed.  Shortly after this he began to experience palpitations.  He came to the emergency department for evaluation.    As noted above, the ER noted the patient in wide-complex tachycardia.  The patient's family noted that he had been worked up for Mark-Parkinson-White in the past but had not been diagnosed with this.  EKGs were analyzed and there was felt to be some intermittent delta waves and the patient was changed from an amiodarone infusion to a procainamide infusion.  Unfortunately, the procainamide was ineffective at controlling his rate or rhythm.  At the time of my examination this morning the patient continued to be in a rapid wide-complex rhythm intermittently.    EKG from this morning demonstrates atrial fibrillation with rapid ventricular response and intermittent aberrant conduction resulting in wide QRS complexes.  Patient reports intensified palpitations as well as some lightheadedness.    Multiple visits were made to the patient's bedside and room.  We discontinued his procainamide and initiated amiodarone first as a bolus but the later as an infusion.  We also tried metoprolol on 2 separate occasions without any  "significant improvement.  Ultimately we decided to cardiovert the patient because pharmacotherapy was proving to be ineffective.  2D echocardiogram was also reviewed and demonstrated a mild cardiomyopathy likely secondary to profound tachycardia at the time of the examination.      Case was discussed with critical care medicine regarding treatment therapies and strategies.  Patient was becoming more symptomatic and hypotensive with continued therapies and arrhythmia.  Decision was made to proceed with DC cardioversion.      Critical care time 2 hours.    IMPRESSIONS  Atrial fibrillation with rapid ventricular response and aberrantly conducted QRS complexes  Alcohol intoxication    RECOMMENDATIONS:  Rate controlling pharmacotherapy proved to be ineffective and the patient ultimately underwent DC cardioversion at the bedside.  Continue IV amiodarone infusion for 18 hours for rhythm stabilization purposes  Check EKG in the morning  Further recommendations as patient's course progresses    Medical History    Past Medical History:   Diagnosis Date    Mark-Parkinson-White syndrome 03/25/2014        Surgical History    History reviewed. No pertinent surgical history.     Family History    Family History   Problem Relation Age of Onset    No Known Problems Mother     No Known Problems Father        Social History    Social History     Tobacco Use    Smoking status: Never    Smokeless tobacco: Never   Substance Use Topics    Alcohol use: Never        Allergies    Allergies   Allergen Reactions    Azithromycin Tinnitus       REVIEW OF SYSTEMS:  Pertinent items are noted in HPI, all other systems reviewed and negative    OBJECTIVE     VITAL SIGNS:  Visit Vitals  /81   Pulse 80   Temp 98.4 °F (36.9 °C) (Oral)   Resp 17   Ht 190.5 cm (75\")   Wt 123 kg (271 lb 2.7 oz)   SpO2 97%   BMI 33.89 kg/m²     Oxygen Therapy  SpO2: 97 %  Pulse Oximetry Type: Continuous  Device (Oxygen Therapy): room air  Flow (L/min): 2  ETCO2 " "(mmHg): 35 mmHg  Flowsheet Rows      Flowsheet Row First Filed Value   Admission Height 190.5 cm (75\") Documented at 09/23/2023 2358   Admission Weight 124 kg (274 lb 0.5 oz) Documented at 09/23/2023 2358          Intake & Output (last 3 days)         09/22 0701  09/23 0700 09/23 0701  09/24 0700 09/24 0701 09/25 0700    Urine (mL/kg/hr)   2000 (1.1)    Total Output   2000    Net   -2000                 Lines, Drains & Airways       Active LDAs       Name Placement date Placement time Site Days    Peripheral IV 09/24/23 0017 Right Antecubital 09/24/23  0017  Antecubital  less than 1    Peripheral IV 09/24/23 0025 Left Antecubital 09/24/23  0025  Antecubital  less than 1                  /81   Pulse 80   Temp 98.4 °F (36.9 °C) (Oral)   Resp 17   Ht 190.5 cm (75\")   Wt 123 kg (271 lb 2.7 oz)   SpO2 97%   BMI 33.89 kg/m²     INTAKE/OUTPUT:    Intake/Output this shift:  No intake/output data recorded.  Intake/Output last 3 shifts:  I/O last 3 completed shifts:  In: -   Out: 2000 [Urine:2000]      PHYSICAL EXAM:    General: Alert, cooperative, mild discomfort appears stated age  Head:  Normocephalic, atraumatic, mucous membranes moist  Eyes:  Conjunctivae/corneas clear, EOM's intact     Neck:  Supple,  no bruit  Lungs: Clear to auscultation bilaterally, no wheezes, rhonchi or rales are noted  Chest wall: No tenderness  Heart::  Tachycardic and irregular rhythm.  S1 and S2 normal, no murmur, rub or gallop  Abdomen: Soft, nontender, nondistended, bowel sounds active  Extremities: No cyanosis, clubbing, or edema  Pulses: 2+ and symmetric all extremities  Skin:  No rashes or lesions  Neuro/psych: A&O x3. CN II through XII are grossly intact with appropriate affect    Scheduled Meds:      sodium chloride, 10 mL, Intravenous, Q12H        Continuous Infusions:    amiodarone, 0.5 mg/min, Last Rate: 0.5 mg/min (09/24/23 1743)        PRN Meds:      acetaminophen **OR** acetaminophen    Calcium Replacement - Follow " Nurse / BPA Driven Protocol    Magnesium Low Dose Replacement - Follow Nurse / BPA Driven Protocol    naloxone    nitroglycerin    ondansetron **OR** ondansetron    Phosphorus Replacement - Follow Nurse / BPA Driven Protocol    Potassium Replacement - Follow Nurse / BPA Driven Protocol    sodium chloride    sodium chloride     Data Review:     X-rays, labs reviewed personally by provider.    CBC    Results from last 7 days   Lab Units 09/24/23  0429 09/24/23 0035 09/24/23 0021   WBC 10*3/mm3 6.80  --  8.40   HEMOGLOBIN g/dL 15.6  --  16.7   HEMOGLOBIN, POC g/dL  --  15.6  --    PLATELETS 10*3/mm3 213  --  253     Cr Clearance Estimated Creatinine Clearance: 175 mL/min (by C-G formula based on SCr of 0.88 mg/dL).  Coag   Results from last 7 days   Lab Units 09/24/23  0021   INR  0.96   APTT seconds 27.2*     HbA1C No results found for: HGBA1C  Blood Glucose   Glucose   Date/Time Value Ref Range Status   09/24/2023 0035 121 (H) 70 - 105 mg/dL Final     Infection     CMP   Results from last 7 days   Lab Units 09/24/23  0842 09/24/23 0035 09/24/23 0021   SODIUM mmol/L 141  --  142   POTASSIUM mmol/L 4.3  --  4.0   CHLORIDE mmol/L 109*  --  105   CO2 mmol/L 20.0*  --  22.0   BUN mg/dL 15  --  17   CREATININE mg/dL 0.88 1.60* 1.23   GLUCOSE mg/dL 101*  --  110*   ALBUMIN g/dL 4.2  --  4.9   BILIRUBIN mg/dL 1.2  --  0.4   ALK PHOS U/L 59  --  77   AST (SGOT) U/L 23  --  21   ALT (SGPT) U/L 17  --  17     ABG      UA      ALMA  No results found for: POCMETH, POCAMPHET, POCBARBITUR, POCBENZO, POCCOCAINE, POCOPIATES, POCOXYCODO, POCPHENCYC, POCPROPOXY, POCTHC, POCTRICYC  Lysis Labs   Results from last 7 days   Lab Units 09/24/23  0842 09/24/23 0429 09/24/23 0035 09/24/23  0021   INR   --   --   --  0.96   APTT seconds  --   --   --  27.2*   HEMOGLOBIN g/dL  --  15.6  --  16.7   HEMOGLOBIN, POC g/dL  --   --  15.6  --    PLATELETS 10*3/mm3  --  213  --  253   CREATININE mg/dL 0.88  --  1.60* 1.23     Radiology(recent) XR  Chest 1 View    Result Date: 9/24/2023  Impression: No acute cardiopulmonary process. Electronically Signed: Cyn Castro MD  9/24/2023 12:52 AM EDT  Workstation ID: [id number]       Results from last 7 days   Lab Units 09/24/23  0021   HSTROP T ng/L <6       ECG/EMG Results (most recent)       Procedure Component Value Units Date/Time    ADULT TRANSTHORACIC ECHO COMPLETE W/ CONT IF NECESSARY PER PROTOCOL [[id number]] Resulted: 09/24/23 0906     Updated: 09/24/23 0911     LVIDd 4.3 cm      LVIDs 3.3 cm      IVSd 0.80 cm      LVPWd 0.80 cm      FS 23.3 %      IVS/LVPW 1.00 cm      ESV(cubed) 35.9 ml      LV Sys Vol (BSA corrected) 17.7 cm2      EDV(cubed) 79.5 ml      LV Chou Vol (BSA corrected) 31.1 cm2      LVOT area 4.5 cm2      LV mass(C)d 105.3 grams      LVOT diam 2.40 cm      EDV(MOD-sp4) 77.6 ml      ESV(MOD-sp4) 44.1 ml      SV(MOD-sp4) 33.5 ml      SI(MOD-sp4) 13.4 ml/m2      EF(MOD-sp4) 43.2 %      MV E max anish 82.3 cm/sec      MV A max anish 48.7 cm/sec      MV dec time 0.11 sec      MV E/A 1.69     LA ESV Index (BP) 22.0 ml/m2      Med Peak E' Anish 4.8 cm/sec      Lat Peak E' Anish 17.0 cm/sec      Avg E/e' ratio 7.55     SV(LVOT) 64.2 ml      RVIDd 3.4 cm      TAPSE (>1.6) 1.41 cm      RV S' 16.9 cm/sec      LA dimension (2D)  3.4 cm      LV V1 max 98.4 cm/sec      LV V1 max PG 3.9 mmHg      LV V1 mean PG 2.00 mmHg      LV V1 VTI 14.2 cm      Ao pk anish 155.0 cm/sec      Ao max PG 9.6 mmHg      Ao mean PG 5.0 mmHg      Ao V2 VTI 22.8 cm      KARINA(I,D) 2.8 cm2      MV max PG 4.4 mmHg      MV mean PG 2.00 mmHg      MV V2 VTI 12.2 cm      MV P1/2t 22.4 msec      MVA(P1/2t) 9.8 cm2      MVA(VTI) 5.3 cm2      MV dec slope 1,371 cm/sec2      RV V1 max PG 3.1 mmHg      RV V1 max 88.6 cm/sec      RV V1 VTI 18.1 cm      PA V2 max 133.0 cm/sec      Sinus 3.8 cm      EF(MOD-bp) 43.0 %     Narrative:        Left ventricular systolic function is moderately decreased. Left   ventricular ejection fraction appears to be 41 -  45%.      ECG 12 Lead Rhythm Change [[id number]] Collected: 09/24/23 0909     Updated: 09/24/23 0912     QT Interval 344 ms      QTC Interval 562 ms     Narrative:      HEART RATE= 160  bpm  RR Interval= 375  ms  MA Interval=   ms  P Horizontal Axis=   deg  P Front Axis=   deg  QRSD Interval= 191  ms  QT Interval= 344  ms  QTcB= 562  ms  QRS Axis= -53  deg  T Wave Axis= 116  deg  - ABNORMAL ECG -  Atrial fibrillation  IVCD, consider RBBB  ST elevation secondary to IVCD  Electronically Signed By:   Date and Time of Study: 2023-09-24 09:09:43    ECG 12 Lead Other; Palpitations [[id number]] Collected: 09/24/23 0006     Updated: 09/24/23 1250     QT Interval 308 ms      QTC Interval 546 ms     Narrative:      HEART RATE= 198  bpm  RR Interval= 318  ms  MA Interval=   ms  P Horizontal Axis=   deg  P Front Axis=   deg  QRSD Interval= 157  ms  QT Interval= 308  ms  QTcB= 546  ms  QRS Axis= -35  deg  T Wave Axis= 125  deg  - ABNORMAL ECG -  Extreme tachycardia with wide complex, no further rhythm analysis attempted  When compared with ECG of 27-Dec-2013 15:45:44,  Significant change in rhythm: previously sinus  Significant repolarization change  Electronically Signed By: Onesimo Yates ([place]) 24-Sep-2023 12:50:27  Date and Time of Study: 2023-09-24 00:06:11    ECG 12 Lead Drug Monitoring; Amiodarone [[id number]] Collected: 09/24/23 1319     Updated: 09/24/23 1325     QT Interval 399 ms      QTC Interval 476 ms     Narrative:      HEART RATE= 85  bpm  RR Interval= 704  ms  MA Interval= 121  ms  P Horizontal Axis= 40  deg  P Front Axis= 39  deg  QRSD Interval= 158  ms  QT Interval= 399  ms  QTcB= 476  ms  QRS Axis= -32  deg  T Wave Axis= 115  deg  - ABNORMAL ECG -  Sinus rhythm  Vent pre-excitat'n(WPW), left acces'y pathway  Electronically Signed By:   Date and Time of Study: 2023-09-24 13:19:10            Imaging:  Imaging Results (Last 72 Hours)       Procedure Component Value Units Date/Time    XR Chest 1 View [[id number]]  Collected: 09/24/23 0052     Updated: 09/24/23 0054    Narrative:      XR CHEST 1 VW    Date of Exam: 9/24/2023 12:26 AM EDT    Indication: soa    Comparison: None available.    Findings:  There are no airspace consolidations. No pleural fluid. No pneumothorax. The pulmonary vasculature appears within normal limits. The cardiac and mediastinal silhouette appear unremarkable. No acute osseous abnormality identified.      Impression:      Impression:  No acute cardiopulmonary process.      Electronically Signed: Cyn Castro MD    9/24/2023 12:52 AM EDT    Workstation ID: [id number]              Dusty Mullins DO  09/24/23  21:19 EDT

## 2023-09-25 NOTE — PAYOR COMM NOTE
"AUTHORIZATION PENDING:   PLEASE CALL OR FAX DETERMINATION TO CONTACT BELOW. THANK YOU.        Chanelle Stephens RN MSN  /UR  [facility]  [phone] office  [phone] fax  luis [email or web address]    [facility]  NPI: [id number]  Tax: [id number]          Dieter Todd (28 y.o. Male)       Date of Birth   1995    Social Security Number       Address   [address] [place] [address]    Home Phone   [phone]    MRN   [id number]       Pentecostalism   None    Marital Status   Single                            Admission Date   9/24/23    Admission Type   Emergency    Admitting Provider   Ang Malone MD    Attending Provider   Bhavin Berg MD    Department, Room/Bed   [facility] INTENSIVE CARE UNIT, 2314/1       Discharge Date       Discharge Disposition       Discharge Destination                                 Attending Provider: Bhavin Berg MD    Allergies: Azithromycin    Isolation: None   Infection: None   Code Status: CPR    Ht: 190.5 cm (75\")   Wt: 123 kg (271 lb 2.7 oz)    Admission Cmt: None   Principal Problem: Ventricular tachycardia [I47.20]                   Active Insurance as of 9/24/2023       Primary Coverage       Payor Plan Insurance Group Employer/Plan Group    [employer] BLUE [facility] PPO [id number]       Payor Plan Address Payor Plan Phone Number Payor Plan Fax Number Effective Dates    [address] [phone]  1/1/2022 - None Entered    [place] [address]         Subscriber Name Subscriber Birth Date Member ID       DIETER TODD 1995 [id number]                     Emergency Contacts        (Rel.) Home Phone Work Phone Mobile Phone    IBRAHIMA GARVIN (Significant Other) [phone] -- [phone]    MAXIJOSE (Mother) [phone] -- [phone]          09/24/23 0237  Inpatient Admission  Once     Completed     Level of Care: Critical " Care  Diagnosis: Ventricular tachycardia [511632]  Admitting Physician: SARAY AUGUSTE [[id number]]  Certification: I Certify That Inpatient Hospital Services Are Medically Necessary For Greater Than 2 Midnights    09          History & Physical        Jovanna Garcia APRN at 23 0250       Attestation signed by Saray Auguste MD at 23 0912      Attending Addendum     Subjective: feels the same, had intermittent palpitations for many years, never had any previous workup.    Exam: Alert and awake, appears comfortable. 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     Dr. Saray Auguste MD MPH  Staff Intensivist  23   09:11 EDT                          Critical Care History and Physical     Dieter Daugherty : 1995 MRN:[id number] LOS:0 ROOM: [facility]     Reason for admission: Ventricular tachycardia     Assessment / Plan     Tachyarrhythmia    -- Likely Mark-Parkinson-White  -Magnesium level 2.3  -Echo  -Continue procainamide infusion  -Consider cardioversion  -Cardiology consulted        Code Status (Patient has no pulse and is not breathing): CPR (Attempt to Resuscitate)  Medical Interventions (Patient has pulse or is breathing): Full Support       Nutrition:   NPO Diet NPO Type: Strict NPO     DVT prophylaxis:  Mechanical DVT prophylaxis orders are present.     History of Present illness     Dieter Daugherty is a 28 y.o. male with no significant PMH presented to the hospital for palpitations, and was admitted with a principal diagnosis of Ventricular tachycardia.  Patient states he was at a wedding earlier today and had a few alcoholic beverages.  He states once he arrived home after the party he noticed long periods of palpitations.  He states when  he was approximately 14 years old that he had a similar problem where he was told he had irregular heart rhythms.  He states he does not remember an actual name of the condition and the palpitations resolved so he did not continue to follow-up.  He states occasionally since then he would have intermittent episodes of palpitations but denies any chest pain.  At the time my assessment, patient denies chest pain, shortness of breath, syncope, or lightheadedness.  He states he can feel the palpitations when his heart rate gets approximately 200 bpm.  Of note, on 3/25/2014 there is a [facility] office visit note discussing Mark-Parkinson-White. I admitted patient to the intensive care unit for further evaluation and treatment.      ACP: Patient wishes to be full code with full intervention; his mother is his decision-maker if he is unable.    Patient was seen and examined on 09/24/23 at 04:45 EDT .    Subjective / Review of systems     Review of Systems   Constitutional:  Negative for diaphoresis and fatigue.   HENT:  Negative for congestion and sore throat.    Eyes:  Negative for pain and redness.   Respiratory:  Negative for cough and shortness of breath.    Cardiovascular:  Positive for palpitations. Negative for chest pain and leg swelling.   Gastrointestinal:  Negative for abdominal pain, nausea and vomiting.   Endocrine: Negative for polydipsia and polyphagia.   Genitourinary:  Negative for dysuria and flank pain.   Musculoskeletal:  Negative for back pain and joint swelling.   Skin:  Negative for color change and pallor.   Neurological:  Negative for dizziness and seizures.   Psychiatric/Behavioral:  Negative for behavioral problems and confusion.       Past Medical/Surgical/Social/Family History & Allergies     History reviewed. No pertinent past medical history.   History reviewed. No pertinent surgical history.   Social History     Socioeconomic History    Marital status: Single   Tobacco Use    Smoking  status: Never    Smokeless tobacco: Never   Vaping Use    Vaping Use: Never used   Substance and Sexual Activity    Alcohol use: Never    Drug use: Never    Sexual activity: Defer      History reviewed. No pertinent family history.   Allergies   Allergen Reactions    Azithromycin Tinnitus      Social Determinants of Health     Tobacco Use: Low Risk     Smoking Tobacco Use: Never    Smokeless Tobacco Use: Never    Passive Exposure: Not on file   Alcohol Use: Not At Risk    Frequency of Alcohol Consumption: Monthly or less    Average Number of Drinks: 1 or 2    Frequency of Binge Drinking: Less than monthly   Financial Resource Strain: Not on file   Food Insecurity: Not on file   Transportation Needs: Not on file   Physical Activity: Not on file   Stress: Not on file   Social Connections: Not on file   Intimate Partner Violence: Not on file   Depression: Not on file   Housing Stability: Not on file        Home Medications     Prior to Admission medications    Not on File        Objective / Physical Exam     Vital signs:  Temp: 97.9 °F (36.6 °C)  BP: 92/71  Heart Rate: (!) 186  Resp: 21  SpO2: 94 %  Weight: 124 kg (274 lb 0.5 oz)    Admission Weight: Weight: 124 kg (274 lb 0.5 oz)    Physical Exam  Vitals and nursing note reviewed.   Constitutional:       Appearance: Normal appearance.   HENT:      Head: Normocephalic.      Nose: Nose normal.      Mouth/Throat:      Mouth: Mucous membranes are moist.      Pharynx: Oropharynx is clear.   Eyes:      Pupils: Pupils are equal, round, and reactive to light.   Cardiovascular:      Rate and Rhythm: Tachycardia present. Rhythm irregular.      Pulses: Normal pulses.      Heart sounds: Normal heart sounds.   Pulmonary:      Effort: Pulmonary effort is normal.      Breath sounds: Normal breath sounds.   Abdominal:      General: Bowel sounds are normal.      Palpations: Abdomen is soft.   Musculoskeletal:         General: Normal range of motion.      Cervical back: Normal range of  motion.   Skin:     General: Skin is warm and dry.      Capillary Refill: Capillary refill takes 2 to 3 seconds.   Neurological:      Mental Status: He is alert and oriented to person, place, and time. Mental status is at baseline.   Psychiatric:         Mood and Affect: Mood normal.         Behavior: Behavior normal.         Thought Content: Thought content normal.         Judgment: Judgment normal.        Labs     Results from last 7 days   Lab Units 09/24/23  0035 09/24/23  0021   WBC 10*3/mm3  --  8.40   HEMATOCRIT %  --  46.7   HEMATOCRIT POC % 46  --    PLATELETS 10*3/mm3  --  253      Results from last 7 days   Lab Units 09/24/23  0035 09/24/23  0021   SODIUM mmol/L  --  142   POTASSIUM mmol/L  --  4.0   CHLORIDE mmol/L  --  105   CO2 mmol/L  --  22.0   BUN mg/dL  --  17   CREATININE mg/dL 1.60* 1.23        Imaging     Chest XR interpreted by me shows no acute processes.    Current Medications     Scheduled Meds:  pantoprazole, 40 mg, Intravenous, Q AM  senna-docusate sodium, 2 tablet, Oral, BID  sodium chloride, 10 mL, Intravenous, Q12H         Continuous Infusions:  procainamide 2000 mg in dextrose 5% infusion, 1-4 mg/min, Last Rate: 4 mg/min (09/24/23 0232)       Plan discussed with RN. Reviewed all other data in the last 24 hours, including but not limited to vitals, labs, microbiology, imaging and pertinent notes from other providers.  Plan also discussed with patient at the bedside.      MICHA Pereira   Critical Care  09/24/23   04:45 EDT       Electronically signed by Ang Malone MD at 09/24/23 0912          Emergency Department Notes        Fernanda Cheung, RN at 09/24/23 0238          Nursing report ED to floor  [facility]  28 y.o.  male    HPI:   Chief Complaint   Patient presents with    Palpitations       Admitting doctor:   Ang Malone MD    Admitting diagnosis:   The encounter diagnosis was V-tach.    Code status:   Current Code Status       Date Active Code  Status Order ID Comments User Context       Not on file            Allergies:   Azithromycin    Isolation:  No active isolations     Fall Risk:  Fall Risk Assessment was completed, and patient is at moderate risk for falls.   Predictive Model Details         1 (Low) Factor Value    Calculated 9/24/2023 02:06 Musculoskeletal Assessment WDL    Risk of Fall Model Active Peripheral IV Present     Imaging order in this encounter Present     Magnesium 2.3 mg/dL     Drug Use Not Asked     Age 28     Respiratory Rate 18     Skin Assessment WDL     Number of Distinct Medication Classes administered 5     Diastolic BP 70     Brady Scale not on file     Financial Class Private Insurance     Peripheral Vascular Assessment WDL     Cardiac Assessment X     Creatinine 1.6 mg/dL     Tobacco Use Not Asked     Clinically Relevant Sex Not Female     Gastrointestinal Assessment WDL     Potassium 4.3 mmol/L     Total Bilirubin 0.4 mg/dL     Days after Admission 0.082     Calcium 9.6 mg/dL     Chloride 105 mmol/L     Albumin 4.9 g/dL     ALT 17 U/L         Weight:       09/23/23 2358   Weight: 124 kg (274 lb 0.5 oz)       Intake and Output  No intake or output data in the 24 hours ending 09/24/23 0238    Diet:        Most recent vitals:   Vitals:    09/24/23 0201 09/24/23 0216 09/24/23 0231 09/24/23 0232   BP: 109/50 99/57 97/68    Pulse: (!) 131 (!) 147 (!) 160    Resp:       Temp:       SpO2: 95% 96%  96%   Weight:       Height:           Active LDAs/IV Access:   Lines, Drains & Airways       Active LDAs       Name Placement date Placement time Site Days    Peripheral IV 09/24/23 0017 Right Antecubital 09/24/23  0017  Antecubital  less than 1    Peripheral IV 09/24/23 0025 Left Antecubital 09/24/23  0025  Antecubital  less than 1                    Skin Condition:   Skin Assessments (last day)       Date/Time Skin WDL    09/24/23 00:26:36 WDL             Labs (abnormal labs have a star):   Labs Reviewed   COMPREHENSIVE METABOLIC  PANEL - Abnormal; Notable for the following components:       Result Value    Glucose 110 (*)     All other components within normal limits    Narrative:     GFR Normal >60  Chronic Kidney Disease <60  Kidney Failure <15     APTT - Abnormal; Notable for the following components:    PTT 27.2 (*)     All other components within normal limits   CBC WITH AUTO DIFFERENTIAL - Abnormal; Notable for the following components:    MCHC 35.9 (*)     All other components within normal limits   POCT CHEM 8 - Abnormal; Notable for the following components:    Glucose 121 (*)     Creatinine 1.60 (*)     Ionized Calcium 1.10 (*)     eGFR 59.4 (*)     All other components within normal limits   SINGLE HSTROPONIN T - Normal    Narrative:     High Sensitive Troponin T Reference Range:  <10.0 ng/L- Negative Female for AMI  <15.0 ng/L- Negative Male for AMI  >=10 - Abnormal Female indicating possible myocardial injury.  >=15 - Abnormal Male indicating possible myocardial injury.   Clinicians would have to utilize clinical acumen, EKG, Troponin, and serial changes to determine if it is an Acute Myocardial Infarction or myocardial injury due to an underlying chronic condition.        TSH - Normal   MAGNESIUM - Normal   PROTIME-INR - Normal   ETHANOL    Narrative:     Plasma Ethanol Clinical Symptoms:    ETOH (%)               Clinical Symptom  .01-.05              No apparent influence  .03-.12              Euphoria, Diminished judgment and attention   .09-.25              Impaired comprehension, Muscle incoordination  .18-.30              Confusion, Staggered gait, Slurred speech  .25-.40              Markedly decreased response to stimuli, unable to stand or                        walk, vomitting, sleep or stupor  .35-.50              Comatose, Anesthesia, Subnormal body temperature       URINE DRUG SCREEN   CBC AND DIFFERENTIAL    Narrative:     The following orders were created for panel order CBC & Differential.  Procedure                                Abnormality         Status                     ---------                               -----------         ------                     CBC Auto Differential[[id number]]        Abnormal            Final result                 Please view results for these tests on the individual orders.       LOC: Person, Place, Time, and Situation    Telemetry:  Critical Care    Cardiac Monitoring Ordered: yes    EKG:   ECG 12 Lead Other; Palpitations   Preliminary Result   HEART RATE= 198  bpm   RR Interval= 318  ms   HI Interval=   ms   P Horizontal Axis=   deg   P Front Axis=   deg   QRSD Interval= 157  ms   QT Interval= 308  ms   QTcB= 546  ms   QRS Axis= -35  deg   T Wave Axis= 125  deg   - ABNORMAL ECG -   Extreme tachycardia with wide complex, no further rhythm analysis    attempted   When compared with ECG of 27-Dec-2013 15:45:44,   Significant change in rhythm: previously sinus   Significant repolarization change   Electronically Signed By:    Date and Time of Study: 2023-09-24 00:06:11          Medications Given in the ED:   Medications   procainamide (PRONESTYL) 2,000 mg in dextrose (D5W) 5 % 500 mL IVPB (4 mg/min Intravenous Rate/Dose Change 9/24/23 0232)   amiodarone 150 mg in 100 mL D5W (loading dose) (0 mg Intravenous Stopped 9/24/23 0028)   thiamine (B-1) 100 mg, folic acid 1 mg in sodium chloride 0.9 % 1,000 mL infusion (250 mL/hr Intravenous New Bag 9/24/23 0121)   procainamide (PRONESTYL) 1,000 mg in dextrose (D5W) 5 % 50 mL IVPB (0 mg Intravenous Stopped 9/24/23 0204)       Imaging results:  XR Chest 1 View    Result Date: 9/24/2023  Impression: No acute cardiopulmonary process. Electronically Signed: Cyn Castro MD  9/24/2023 12:52 AM EDT  Workstation ID: [id number]     Social issues:   Social History     Socioeconomic History    Marital status: Single       NIH Stroke Scale:  Interval: (not recorded)  1a. Level of Consciousness: (not recorded)  1b. LOC Questions: (not recorded)  1c. LOC Commands:  (not recorded)  2. Best Gaze: (not recorded)  3. Visual: (not recorded)  4. Facial Palsy: (not recorded)  5a. Motor Arm, Left: (not recorded)  5b. Motor Arm, Right: (not recorded)  6a. Motor Leg, Left: (not recorded)  6b. Motor Leg, Right: (not recorded)  7. Limb Ataxia: (not recorded)  8. Sensory: (not recorded)  9. Best Language: (not recorded)  10. Dysarthria: (not recorded)  11. Extinction and Inattention (formerly Neglect): (not recorded)    Total (NIH Stroke Scale): (not recorded)     Additional notable assessment information:na     Nursing report ED to floor:  Report called to VITO Arnold RN   09/24/23 02:38 EDT     Electronically signed by Fernanda Cheung RN at 09/24/23 0238       Chanelle Mera RN at 09/24/23 0121          Dr. Yates states to bolus IV fluids instead of 250 ml/hr    Electronically signed by Chanelle Mera RN at 09/24/23 0242       Onesimo Yates MD at 09/24/23 0024          Subjective   History of Present Illness  29 yo male with remote hx of palpitations around 14 years of age that never cause any issues.  Patient denies any other hx but states 1 hour pta he developed fast palpitations.  Patient denies any dizziness, cp, soa, syncope.  Patient admits to etoh today at a wedding but does not normally abuse etoh, no substance abuse, no known FH of cardiac issues.    Review of Systems   Constitutional: Negative.    Respiratory: Negative.     Cardiovascular:  Positive for palpitations. Negative for chest pain.   Neurological: Negative.      No past medical history on file.    Allergies   Allergen Reactions    Azithromycin Tinnitus       No past surgical history on file.    No family history on file.    Social History     Socioeconomic History    Marital status: Single           Objective   Physical Exam  Constitutional:       Appearance: Normal appearance.   HENT:      Head: Normocephalic and atraumatic.      Mouth/Throat:      Mouth: Mucous membranes are  moist.      Pharynx: Oropharynx is clear.   Eyes:      Conjunctiva/sclera: Conjunctivae normal.      Pupils: Pupils are equal, round, and reactive to light.   Cardiovascular:      Rate and Rhythm: Tachycardia present. Rhythm irregular.   Pulmonary:      Effort: Pulmonary effort is normal.      Breath sounds: Normal breath sounds.   Musculoskeletal:         General: Normal range of motion.   Skin:     General: Skin is warm and dry.      Capillary Refill: Capillary refill takes less than 2 seconds.   Neurological:      General: No focal deficit present.      Mental Status: He is alert and oriented to person, place, and time.   Psychiatric:         Mood and Affect: Mood normal.         Behavior: Behavior normal.       Procedures          ED Course                                           Medical Decision Making  28-year-old male with likely WPW with resultant V. tach that has been stable throughout ED course.  Initially amiodarone was given with no improvement, after discussion with Dr. Mullins, who reviewed EKGs, will switch to procainamide.    Patient has received procainamide bolus and is on infusion with some improvement in rhythm, still asymptomatic, no electrolyte disturbance on labs.  Case discussed with Shanice with intensivist service, will continue procainamide drip, a.m. echo, observe for any decompensation    Critical care time 45 minutes    Problems Addressed:  V-tach: complicated acute illness or injury    Amount and/or Complexity of Data Reviewed  Labs: ordered.  Radiology: ordered.  ECG/medicine tests: ordered.     Details: EKG interpretation:  Wide complex tachycardia, rate 198    Risk  Prescription drug management.  Decision regarding hospitalization.        Final diagnoses:   V-tach       ED Disposition  ED Disposition       ED Disposition   Decision to Admit    Condition   --    Comment   Level of Care: Critical Care [6]   Admitting Physician: RIANNA ERAZO [[id number]]                 No  follow-up provider specified.       Medication List      No changes were made to your prescriptions during this visit.            Onesimo Yates MD  09/24/23 0231      Electronically signed by Onesimo Yates MD at 09/24/23 0231          Operative/Procedure Notes (all)        Dusty Mullins DO at 09/24/23 1200  Version 2 of 2       Procedures    1. ELECTRICAL CARDIOVERSION [JXC096 (Custom)]                 Procedure:  Bedside DC cardioversion    Indications:  Atrial fibrillation with rapid ventricular response unresponsive to pharmacotherapy  Impending shock/hemodynamic instability    Complications:  None    :  Dr. Mullins    Anesthesia:  IV sedation provided by critical care medicine    Description of procedure:  The patient was attached to appropriate monitoring equipment as well as defibrillator pads.  After adequate IV sedation was achieved, a single 120 J shock was delivered.  This was unsuccessful at cardioversion.  Further IV sedation was administered.  Energy was increased to 200 J.  This converted the patient to sinus rhythm.    Electronically signed by Dusty Mullins DO at 09/24/23 2134       Dusty Mullins DO at 09/24/23 2128  Version 1 of 2       Procedures    1. ELECTRICAL CARDIOVERSION [XSV651 (Custom)]                 Procedure:  Bedside DC cardioversion    Indications:  Atrial fibrillation with rapid ventricular response unresponsive to pharmacotherapy  Impending shock/hemodynamic instability    Complications:  None    :  Dr. Mullins    Anesthesia:  IV sedation provided by critical care medicine    Description of procedure:  The patient was attached to appropriate monitoring equipment as well as defibrillator pads.  After adequate IV sedation was achieved, a single 120 J shock was delivered.  This was unsuccessful at cardioversion.  Further IV sedation was administered.  Energy was increased to 200 J.  This converted the patient to sinus  rhythm.    Electronically signed by Dusty Mullins DO at 23 2133          Physician Progress Notes (all)        Bhavin Berg MD at 23 0922              [facility] Medicine Services Daily Progress Note    Patient Name: Dieter Daugherty  : 1995  MRN: [id number]  Primary Care Physician:  Provider, No Known  Date of admission: 2023    Subjective      Chief Complaint: Ventricular tachycardia      Dieter Daugherty is a 28 y.o. male with no significant PMH presented to the hospital for palpitations, and was admitted with a principal diagnosis of Ventricular tachycardia.  Patient states he was at a wedding earlier today and had a few alcoholic beverages.  He states once he arrived home after the party he noticed long periods of palpitations.  He states when he was approximately 14 years old that he had a similar problem where he was told he had irregular heart rhythms.  He states he does not remember an actual name of the condition and the palpitations resolved so he did not continue to follow-up.  He states occasionally since then he would have intermittent episodes of palpitations but denies any chest pain.  At the time my assessment, patient denies chest pain, shortness of breath, syncope, or lightheadedness.  He states he can feel the palpitations when his heart rate gets approximately 200 bpm.  Of note, on 3/25/2014 there is a [facility] office visit note discussing Mark-Parkinson-White. I admitted patient to the intensive care unit for further evaluation and treatment.     ACP: Patient wishes to be full code with full intervention; his mother is his decision-maker if he is unable.     Patient was seen and examined on 23 at 04:45 EDT .    ROS Constitutional:  Negative for diaphoresis and fatigue.   HENT:  Negative for congestion and sore throat.    Eyes:  Negative for pain and redness.   Respiratory:  Negative for cough and shortness of  breath.    Cardiovascular:  Positive for palpitations. Negative for chest pain and leg swelling.   Gastrointestinal:  Negative for abdominal pain, nausea and vomiting.   Endocrine: Negative for polydipsia and polyphagia.   Genitourinary:  Negative for dysuria and flank pain.   Musculoskeletal:  Negative for back pain and joint swelling.   Skin:  Negative for color change and pallor.   Neurological:  Negative for dizziness and seizures.   Psychiatric/Behavioral:  Negative for behavioral problems and confusion.     Objective      Vitals:   Temp:  [98.1 °F (36.7 °C)-98.4 °F (36.9 °C)] 98.1 °F (36.7 °C)  Heart Rate:  [] 63  Resp:  [11-17] 11  BP: ()/(49-92) 118/76    Physical Exam Vitals and nursing note reviewed.   Constitutional:       Appearance: Normal appearance.   HENT:      Head: Normocephalic.      Nose: Nose normal.      Mouth/Throat:      Mouth: Mucous membranes are moist.      Pharynx: Oropharynx is clear.   Eyes:      Pupils: Pupils are equal, round, and reactive to light.   Cardiovascular:      Rate and Rhythm: Tachycardia present. Rhythm irregular.      Pulses: Normal pulses.      Heart sounds: Normal heart sounds.   Pulmonary:      Effort: Pulmonary effort is normal.      Breath sounds: Normal breath sounds.   Abdominal:      General: Bowel sounds are normal.      Palpations: Abdomen is soft.   Musculoskeletal:         General: Normal range of motion.      Cervical back: Normal range of motion.   Skin:     General: Skin is warm and dry.      Capillary Refill: Capillary refill takes 2 to 3 seconds.   Neurological:      Mental Status: He is alert and oriented to person, place, and time. Mental status is at baseline.   Psychiatric:         Mood and Affect: Mood normal.         Behavior: Behavior normal.         Thought Content: Thought content normal.         Judgment: Judgment normal.        Result Review    Result Review:  I have personally reviewed the results from the time of this admission  to 9/25/2023 09:22 EDT and agree with these findings:  []  Laboratory  []  Microbiology  []  Radiology  []  EKG/Telemetry   []  Cardiology/Vascular   []  Pathology  []  Old records  []  Other:  Most notable findings include:     Wounds (last 24 hours)       LDA Wound       Row Name 09/25/23 0830 09/25/23 0402 09/25/23 0355       Wound 09/25/23 0402 Left lower sternal Other (comment)    Wound - Properties Group Placement Date: 09/25/23  -AD Placement Time: 0402  -AD Present on Hospital Admission: N  -AD Side: Left  -AD Orientation: lower  -AD Location: sternal  -AD Primary Wound Type: Other  -AD Additional Comments: Rash from the defib. pad  -AD    Pressure Injury Stage -- O  -AD --    Closure -- Open to air  -AD --    Periwound -- redness  -AD --    Periwound Temperature -- warm  -AD --    Periwound Care -- moisturizer applied  -AD --    Retired Wound - Properties Group Placement Date: 09/25/23  -AD Placement Time: 0402  -AD Present on Hospital Admission: N  -AD Side: Left  -AD Orientation: lower  -AD Location: sternal  -AD Primary Wound Type: Other  -AD Additional Comments: Rash from the defib. pad  -AD    Retired Wound - Properties Group Date first assessed: 09/25/23  -AD Time first assessed: 0402  -AD Present on Hospital Admission: N  -AD Side: Left  -AD Location: sternal  -AD Primary Wound Type: Other  -AD Additional Comments: Rash from the defib. pad  -AD       Rash 09/24/23 0308 Right anterior foot papule    Rash - Properties Group Placement Date: 09/24/23  -JS Placement Time: 0308  -JS Side: Right  -JS Orientation: anterior  -JS Location: foot  -JS Type: papule  -JS    Distribution generalized  -MS -- generalized  -AD    Borders irregular  -MS -- irregular  -AD    Characteristics itching  -MS -- itching  -AD    Lesion Size less than 0.5 cm  -MS -- --    Retired Rash - Properties Group Placement Date: 09/24/23  -JS Placement Time: 0308  -JS Side: Right  -JS Orientation: anterior  -JS Location: foot  -JS Type:  papule  -JS    Retired Rash - Properties Group Date first assessed: 09/24/23  -JS Time first assessed: 0308  -JS Side: Right  -JS Orientation: anterior  -JS Location: foot  -JS Type: papule  -JS       Rash 09/24/23 0250 Left anterior foot patch    Rash - Properties Group Placement Date: 09/24/23  -JS Placement Time: 0250  -JS Side: Left  -JS Orientation: anterior  -JS Location: foot  -JS Type: patch  -JS    Distribution generalized  -MS -- generalized  -AD    Borders irregular  -MS -- irregular  -AD    Characteristics itching  -MS -- itching  -AD    Lesion Size less than 0.5 cm  -MS -- --    Retired Rash - Properties Group Placement Date: 09/24/23  -JS Placement Time: 0250  -JS Side: Left  -JS Orientation: anterior  -JS Location: foot  -JS Type: patch  -JS    Retired Rash - Properties Group Date first assessed: 09/24/23  -JS Time first assessed: 0250  -JS Side: Left  -JS Orientation: anterior  -JS Location: foot  -JS Type: patch  -JS      Row Name 09/25/23 0000 09/24/23 2015 09/24/23 1600       Rash 09/24/23 0308 Right anterior foot papule    Rash - Properties Group Placement Date: 09/24/23  -JS Placement Time: 0308  -JS Side: Right  -JS Orientation: anterior  -JS Location: foot  -JS Type: papule  -JS    Distribution generalized  -AD generalized  -MC generalized  -SE    Color red  -AD -- --    Configuration/Shape asymmetric  -AD -- --    Borders irregular  -AD irregular  -MC irregular  -SE    Characteristics itching  -AD itching  -MC itching  -SE    Lesion Size -- less than 0.5 cm  -MC less than 0.5 cm  -SE    Care, Rash open to air  -AD -- --    Retired Rash - Properties Group Placement Date: 09/24/23  -JS Placement Time: 0308  -JS Side: Right  -JS Orientation: anterior  -JS Location: foot  -JS Type: papule  -JS    Retired Rash - Properties Group Date first assessed: 09/24/23  -JS Time first assessed: 0308  -JS Side: Right  -JS Orientation: anterior  -JS Location: foot  -JS Type: papule  -JS       Rash 09/24/23  0250 Left anterior foot patch    Rash - Properties Group Placement Date: 09/24/23  -JS Placement Time: 0250  -JS Side: Left  -JS Orientation: anterior  -JS Location: foot  -JS Type: patch  -JS    Distribution generalized  -AD generalized  -MC generalized  -SE    Color red  -AD -- --    Configuration/Shape asymmetric  -AD -- --    Borders irregular  -AD irregular  -MC irregular  -SE    Characteristics itching  -AD itching  -MC itching  -SE    Lesion Size -- less than 0.5 cm  -MC less than 0.5 cm  -SE    Care, Rash open to air  -AD -- --    Retired Rash - Properties Group Placement Date: 09/24/23  -JS Placement Time: 0250  -JS Side: Left  -JS Orientation: anterior  -JS Location: foot  -JS Type: patch  -JS    Retired Rash - Properties Group Date first assessed: 09/24/23  -JS Time first assessed: 0250  -JS Side: Left  -JS Orientation: anterior  -JS Location: foot  -JS Type: patch  -JS      Row Name 09/24/23 1200             Rash 09/24/23 0308 Right anterior foot papule    Rash - Properties Group Placement Date: 09/24/23  -JS Placement Time: 0308  -JS Side: Right  -JS Orientation: anterior  -JS Location: foot  -JS Type: papule  -JS    Distribution generalized  -SE      Borders irregular  -SE      Characteristics itching  -SE      Lesion Size less than 0.5 cm  -SE      Retired Rash - Properties Group Placement Date: 09/24/23  -JS Placement Time: 0308  -JS Side: Right  -JS Orientation: anterior  -JS Location: foot  -JS Type: papule  -JS    Retired Rash - Properties Group Date first assessed: 09/24/23  -JS Time first assessed: 0308  -JS Side: Right  -JS Orientation: anterior  -JS Location: foot  -JS Type: papule  -JS       Rash 09/24/23 0250 Left anterior foot patch    Rash - Properties Group Placement Date: 09/24/23  -JS Placement Time: 0250  -JS Side: Left  -JS Orientation: anterior  -JS Location: foot  -JS Type: patch  -JS    Distribution generalized  -SE      Borders irregular  -SE      Characteristics itching  -SE       Lesion Size less than 0.5 cm  -      Retired Rash - Properties Group Placement Date: 09/24/23  -JS Placement Time: 0250  -JS Side: Left  -JS Orientation: anterior  -JS Location: foot  -JS Type: patch  -JS    Retired Rash - Properties Group Date first assessed: 09/24/23  -JS Time first assessed: 0250  -JS Side: Left  -JS Orientation: anterior  -JS Location: foot  -JS Type: patch  -JS              User Key  (r) = Recorded By, (t) = Taken By, (c) = Cosigned By      Initials Name Provider Type    Umm Wei, RN Registered Nurse    MS SlimeYudelka RN Registered Nurse    Solomon Hayward, RN Registered Nurse    Evangelina Mendoza RN Registered Nurse    Brooklynn Godoy, RN Registered Nurse                    CBC:      Lab 09/25/23 0416 09/24/23  0429 09/24/23  0035 09/24/23  0021   WBC 5.40 6.80  --  8.40   HEMOGLOBIN 15.0 15.6  --  16.7   HEMOGLOBIN, POC  --   --    < >  --    HEMATOCRIT 42.2 44.4  --  46.7   HEMATOCRIT POC  --   --    < >  --    PLATELETS 181 213  --  253   NEUTROS ABS 3.30 4.10  --  5.30   LYMPHS ABS 1.50 2.10  --  2.10   MONOS ABS 0.40 0.50  --  0.90   EOS ABS 0.20 0.00  --  0.10   MCV 89.3 89.4  --  89.1    < > = values in this interval not displayed.     CMP:        Lab 09/25/23 0416 09/24/23  0842 09/24/23  0035 09/24/23  0021   SODIUM 140 141  --  142   POTASSIUM 4.1 4.3  --  4.0   CHLORIDE 107 109*  --  105   CO2 25.0 20.0*  --  22.0   POC ANION GAP ISTAT  --   --  17.0  --    ANION GAP 8.0 12.0  --  15.0   BUN 13 15  --  17   CREATININE 0.97 0.88 1.60* 1.23   EGFR 108.4 119.4 59.4* 81.5   GLUCOSE 105* 101*  --  110*   CALCIUM 8.9 9.1  --  9.6   MAGNESIUM 2.0 2.0  --  2.3   TOTAL PROTEIN 5.7* 6.3  --  7.2   ALBUMIN 3.7 4.2  --  4.9   GLOBULIN 2.0 2.1  --  2.3   ALT (SGPT) 11 17  --  17   AST (SGOT) 14 23  --  21   BILIRUBIN 0.8 1.2  --  0.4   ALK PHOS 53 59  --  77     No results found for: ACANTHNAEG, AFBCX, BPERTUSSISCX, BLOODCX  No results found for: BCIDPCR,  CXREFLEX, CSFCX, CULTURETIS  No results found for: CULTURES, HSVCX, URCX  No results found for: EYECULTURE, GCCX, HSVCULTURE, LABHSV  No results found for: LEGIONELLA, MRSACX, MUMPSCX, MYCOPLASCX  No results found for: NOCARDIACX, STOOLCX  No results found for: THROATCX, UNSTIMCULT, URINECX, CULTURE, VZVCULTUR  No results found for: VIRALCULTU, WOUNDCX      Assessment & Plan      Brief Patient Summary:  Dieter Daugherty is a 28 y.o. male who presented to the hospital for palpitations, and was admitted with a principal diagnosis of Ventricular tachycardia.  Patient states he was at a wedding earlier today and had a few alcoholic beverages.  He states once he arrived home after the party he noticed long periods of palpitations.  He states when he was approximately 14 years old that he had a similar problem where he was told he had irregular heart rhythms.  He states he does not remember an actual name of the condition and the palpitations resolved so he did not continue to follow-up.  He states occasionally since then he would have intermittent episodes of palpitations but denies any chest pain.  At the time my assessment, patient denies chest pain, shortness of breath, syncope, or lightheadedness.  He states he can feel the palpitations when his heart rate gets approximately 200 bpm.  Of note, on 3/25/2014 there is a [facility] office visit note discussing Mark-Parkinson-White. I admitted patient to the intensive care unit for further evaluation and treatment.       sodium chloride, 10 mL, Intravenous, Q12H             Active Hospital Problems:  Active Hospital Problems    Diagnosis     **Ventricular tachycardia      Tachyarrhythmia/ Atrial fibrillation with rapid ventricular response unresponsive to pharmacotherapy  Impending shock/hemodynamic instability    -- Likely Mark-Parkinson-White  -Magnesium level 2.3  -Echo-    Left ventricular systolic function is moderately decreased. Left ventricular ejection  fraction appears to be 41 - 45%.  - procainamide infusion dc  -s/p cardioversion  -Cardiology consulted:The patient was attached to appropriate monitoring equipment as well as defibrillator pads. After adequate IV sedation was achieved, a single 120 J shock was delivered. This was unsuccessful at cardioversion. Further IV sedation was administered. Energy was increased to 200 J. This converted the patient to sinus rhythm.     DVT prophylaxis:  Mechanical DVT prophylaxis orders are present.    CODE STATUS:    Code Status (Patient has no pulse and is not breathing): CPR (Attempt to Resuscitate)  Medical Interventions (Patient has pulse or is breathing): Full Support    Disposition:  I expect patient to be discharged home.    I have utilized all available, immediate resources to obtain, update, or review the patient's current medications including all prescriptions, over-the-counter products, herbals, cannabis/cannabidiol products, and vitamin/mineral/dietary (nutritional) supplements.    Code Status (Patient has no pulse and is not breathing): CPR (Attempt to Resuscitate)  Medical Interventions (Patient has pulse or is breathing): Full Support    Next of kin or Power of :     Admission Status:  I believe this patient meets admit status.    This patient has been examined wearing appropriate Personal Protective Equipment and discussed with  rn . 23    Electronically signed by Bhavin Berg MD, 23, 09:22 EDT.  [facility] Hospitalist Team             Electronically signed by Bhavin Berg MD at 23 0928          Consult Notes (all)        Dusty Mullins DO at 23 0915        Consult Orders    1. Inpatient Cardiology Consult [[id number]] ordered by Onesimo Yates MD at 23 0036                 CARDIOLOGY CONSULT      Requesting Provider    Bhavin Berg MD      PATIENT IDENTIFICATION    Name: Dieter Daugherty  Age: 28 y.o. Sex: male : 1995  MRN:  [id number]    REASON FOR CONSULTATION:  Wide-complex tachycardia    CHIEF COMPLAINT:  Palpitations    HISTORY OF PRESENT ILLNESS:   This is a 28-year-old male with no significant past medical history presented to the hospital with a complaint of palpitations.  His EKG in the emergency department demonstrated wide-complex tachycardia with a suspicion for either ventricular tachycardia or Mark-Parkinson-White.  The patient had been at a wedding earlier in the day and had a lot of alcoholic beverages.  The patient notes that he was very drunk.  In fact he went home after the wedding and vomited from all of the alcohol he consumed.  Shortly after this he began to experience palpitations.  He came to the emergency department for evaluation.    As noted above, the ER noted the patient in wide-complex tachycardia.  The patient's family noted that he had been worked up for Mark-Parkinson-White in the past but had not been diagnosed with this.  EKGs were analyzed and there was felt to be some intermittent delta waves and the patient was changed from an amiodarone infusion to a procainamide infusion.  Unfortunately, the procainamide was ineffective at controlling his rate or rhythm.  At the time of my examination this morning the patient continued to be in a rapid wide-complex rhythm intermittently.    EKG from this morning demonstrates atrial fibrillation with rapid ventricular response and intermittent aberrant conduction resulting in wide QRS complexes.  Patient reports intensified palpitations as well as some lightheadedness.    Multiple visits were made to the patient's bedside and room.  We discontinued his procainamide and initiated amiodarone first as a bolus but the later as an infusion.  We also tried metoprolol on 2 separate occasions without any significant improvement.  Ultimately we decided to cardiovert the patient because pharmacotherapy was proving to be ineffective.  2D echocardiogram was also reviewed and  "demonstrated a mild cardiomyopathy likely secondary to profound tachycardia at the time of the examination.      Case was discussed with critical care medicine regarding treatment therapies and strategies.  Patient was becoming more symptomatic and hypotensive with continued therapies and arrhythmia.  Decision was made to proceed with DC cardioversion.      Critical care time 2 hours.    IMPRESSIONS  Atrial fibrillation with rapid ventricular response and aberrantly conducted QRS complexes  Alcohol intoxication    RECOMMENDATIONS:  Rate controlling pharmacotherapy proved to be ineffective and the patient ultimately underwent DC cardioversion at the bedside.  Continue IV amiodarone infusion for 18 hours for rhythm stabilization purposes  Check EKG in the morning  Further recommendations as patient's course progresses    Medical History    Past Medical History:   Diagnosis Date    Mark-Parkinson-White syndrome 03/25/2014        Surgical History    History reviewed. No pertinent surgical history.     Family History    Family History   Problem Relation Age of Onset    No Known Problems Mother     No Known Problems Father        Social History    Social History     Tobacco Use    Smoking status: Never    Smokeless tobacco: Never   Substance Use Topics    Alcohol use: Never        Allergies    Allergies   Allergen Reactions    Azithromycin Tinnitus       REVIEW OF SYSTEMS:  Pertinent items are noted in HPI, all other systems reviewed and negative    OBJECTIVE     VITAL SIGNS:  Visit Vitals  /81   Pulse 80   Temp 98.4 °F (36.9 °C) (Oral)   Resp 17   Ht 190.5 cm (75\")   Wt 123 kg (271 lb 2.7 oz)   SpO2 97%   BMI 33.89 kg/m²     Oxygen Therapy  SpO2: 97 %  Pulse Oximetry Type: Continuous  Device (Oxygen Therapy): room air  Flow (L/min): 2  ETCO2 (mmHg): 35 mmHg  Flowsheet Rows      Flowsheet Row First Filed Value   Admission Height 190.5 cm (75\") Documented at 09/23/2023 1472   Admission Weight 124 kg (274 lb 0.5 oz) " "Documented at 09/23/2023 2358          Intake & Output (last 3 days)         09/22 0701  09/23 0700 09/23 0701  09/24 0700 09/24 0701 09/25 0700    Urine (mL/kg/hr)   2000 (1.1)    Total Output   2000    Net   -2000                 Lines, Drains & Airways       Active LDAs       Name Placement date Placement time Site Days    Peripheral IV 09/24/23 0017 Right Antecubital 09/24/23  0017  Antecubital  less than 1    Peripheral IV 09/24/23 0025 Left Antecubital 09/24/23  0025  Antecubital  less than 1                  /81   Pulse 80   Temp 98.4 °F (36.9 °C) (Oral)   Resp 17   Ht 190.5 cm (75\")   Wt 123 kg (271 lb 2.7 oz)   SpO2 97%   BMI 33.89 kg/m²     INTAKE/OUTPUT:    Intake/Output this shift:  No intake/output data recorded.  Intake/Output last 3 shifts:  I/O last 3 completed shifts:  In: -   Out: 2000 [Urine:2000]      PHYSICAL EXAM:    General: Alert, cooperative, mild discomfort appears stated age  Head:  Normocephalic, atraumatic, mucous membranes moist  Eyes:  Conjunctivae/corneas clear, EOM's intact     Neck:  Supple,  no bruit  Lungs: Clear to auscultation bilaterally, no wheezes, rhonchi or rales are noted  Chest wall: No tenderness  Heart::  Tachycardic and irregular rhythm.  S1 and S2 normal, no murmur, rub or gallop  Abdomen: Soft, nontender, nondistended, bowel sounds active  Extremities: No cyanosis, clubbing, or edema  Pulses: 2+ and symmetric all extremities  Skin:  No rashes or lesions  Neuro/psych: A&O x3. CN II through XII are grossly intact with appropriate affect    Scheduled Meds:      sodium chloride, 10 mL, Intravenous, Q12H        Continuous Infusions:    amiodarone, 0.5 mg/min, Last Rate: 0.5 mg/min (09/24/23 1743)        PRN Meds:      acetaminophen **OR** acetaminophen    Calcium Replacement - Follow Nurse / BPA Driven Protocol    Magnesium Low Dose Replacement - Follow Nurse / BPA Driven Protocol    naloxone    nitroglycerin    ondansetron **OR** ondansetron    Phosphorus " Replacement - Follow Nurse / BPA Driven Protocol    Potassium Replacement - Follow Nurse / BPA Driven Protocol    sodium chloride    sodium chloride     Data Review:     X-rays, labs reviewed personally by provider.    CBC    Results from last 7 days   Lab Units 09/24/23 0429 09/24/23 0035 09/24/23 0021   WBC 10*3/mm3 6.80  --  8.40   HEMOGLOBIN g/dL 15.6  --  16.7   HEMOGLOBIN, POC g/dL  --  15.6  --    PLATELETS 10*3/mm3 213  --  253     Cr Clearance Estimated Creatinine Clearance: 175 mL/min (by C-G formula based on SCr of 0.88 mg/dL).  Coag   Results from last 7 days   Lab Units 09/24/23  0021   INR  0.96   APTT seconds 27.2*     HbA1C No results found for: HGBA1C  Blood Glucose   Glucose   Date/Time Value Ref Range Status   09/24/2023 0035 121 (H) 70 - 105 mg/dL Final     Infection     CMP   Results from last 7 days   Lab Units 09/24/23 0842 09/24/23 0035 09/24/23 0021   SODIUM mmol/L 141  --  142   POTASSIUM mmol/L 4.3  --  4.0   CHLORIDE mmol/L 109*  --  105   CO2 mmol/L 20.0*  --  22.0   BUN mg/dL 15  --  17   CREATININE mg/dL 0.88 1.60* 1.23   GLUCOSE mg/dL 101*  --  110*   ALBUMIN g/dL 4.2  --  4.9   BILIRUBIN mg/dL 1.2  --  0.4   ALK PHOS U/L 59  --  77   AST (SGOT) U/L 23  --  21   ALT (SGPT) U/L 17  --  17     ABG      UA      ALMA  No results found for: POCMETH, POCAMPHET, POCBARBITUR, POCBENZO, POCCOCAINE, POCOPIATES, POCOXYCODO, POCPHENCYC, POCPROPOXY, POCTHC, POCTRICYC  Lysis Labs   Results from last 7 days   Lab Units 09/24/23  0842 09/24/23 0429 09/24/23 0035 09/24/23 0021   INR   --   --   --  0.96   APTT seconds  --   --   --  27.2*   HEMOGLOBIN g/dL  --  15.6  --  16.7   HEMOGLOBIN, POC g/dL  --   --  15.6  --    PLATELETS 10*3/mm3  --  213  --  253   CREATININE mg/dL 0.88  --  1.60* 1.23     Radiology(recent) XR Chest 1 View    Result Date: 9/24/2023  Impression: No acute cardiopulmonary process. Electronically Signed: Cyn Castro MD  9/24/2023 12:52 AM EDT  Workstation ID: [id number]        Results from last 7 days   Lab Units 09/24/23  0021   HSTROP T ng/L <6       ECG/EMG Results (most recent)       Procedure Component Value Units Date/Time    ADULT TRANSTHORACIC ECHO COMPLETE W/ CONT IF NECESSARY PER PROTOCOL [[id number]] Resulted: 09/24/23 0906     Updated: 09/24/23 0911     LVIDd 4.3 cm      LVIDs 3.3 cm      IVSd 0.80 cm      LVPWd 0.80 cm      FS 23.3 %      IVS/LVPW 1.00 cm      ESV(cubed) 35.9 ml      LV Sys Vol (BSA corrected) 17.7 cm2      EDV(cubed) 79.5 ml      LV Chou Vol (BSA corrected) 31.1 cm2      LVOT area 4.5 cm2      LV mass(C)d 105.3 grams      LVOT diam 2.40 cm      EDV(MOD-sp4) 77.6 ml      ESV(MOD-sp4) 44.1 ml      SV(MOD-sp4) 33.5 ml      SI(MOD-sp4) 13.4 ml/m2      EF(MOD-sp4) 43.2 %      MV E max anish 82.3 cm/sec      MV A max anish 48.7 cm/sec      MV dec time 0.11 sec      MV E/A 1.69     LA ESV Index (BP) 22.0 ml/m2      Med Peak E' Anish 4.8 cm/sec      Lat Peak E' Anish 17.0 cm/sec      Avg E/e' ratio 7.55     SV(LVOT) 64.2 ml      RVIDd 3.4 cm      TAPSE (>1.6) 1.41 cm      RV S' 16.9 cm/sec      LA dimension (2D)  3.4 cm      LV V1 max 98.4 cm/sec      LV V1 max PG 3.9 mmHg      LV V1 mean PG 2.00 mmHg      LV V1 VTI 14.2 cm      Ao pk anish 155.0 cm/sec      Ao max PG 9.6 mmHg      Ao mean PG 5.0 mmHg      Ao V2 VTI 22.8 cm      KARINA(I,D) 2.8 cm2      MV max PG 4.4 mmHg      MV mean PG 2.00 mmHg      MV V2 VTI 12.2 cm      MV P1/2t 22.4 msec      MVA(P1/2t) 9.8 cm2      MVA(VTI) 5.3 cm2      MV dec slope 1,371 cm/sec2      RV V1 max PG 3.1 mmHg      RV V1 max 88.6 cm/sec      RV V1 VTI 18.1 cm      PA V2 max 133.0 cm/sec      Sinus 3.8 cm      EF(MOD-bp) 43.0 %     Narrative:        Left ventricular systolic function is moderately decreased. Left   ventricular ejection fraction appears to be 41 - 45%.      ECG 12 Lead Rhythm Change [[id number]] Collected: 09/24/23 0909     Updated: 09/24/23 0912     QT Interval 344 ms      QTC Interval 562 ms     Narrative:      HEART  RATE= 160  bpm  RR Interval= 375  ms  VA Interval=   ms  P Horizontal Axis=   deg  P Front Axis=   deg  QRSD Interval= 191  ms  QT Interval= 344  ms  QTcB= 562  ms  QRS Axis= -53  deg  T Wave Axis= 116  deg  - ABNORMAL ECG -  Atrial fibrillation  IVCD, consider RBBB  ST elevation secondary to IVCD  Electronically Signed By:   Date and Time of Study: 2023-09-24 09:09:43    ECG 12 Lead Other; Palpitations [[id number]] Collected: 09/24/23 0006     Updated: 09/24/23 1250     QT Interval 308 ms      QTC Interval 546 ms     Narrative:      HEART RATE= 198  bpm  RR Interval= 318  ms  VA Interval=   ms  P Horizontal Axis=   deg  P Front Axis=   deg  QRSD Interval= 157  ms  QT Interval= 308  ms  QTcB= 546  ms  QRS Axis= -35  deg  T Wave Axis= 125  deg  - ABNORMAL ECG -  Extreme tachycardia with wide complex, no further rhythm analysis attempted  When compared with ECG of 27-Dec-2013 15:45:44,  Significant change in rhythm: previously sinus  Significant repolarization change  Electronically Signed By: Onesimo Yates ([place]) 24-Sep-2023 12:50:27  Date and Time of Study: 2023-09-24 00:06:11    ECG 12 Lead Drug Monitoring; Amiodarone [[id number]] Collected: 09/24/23 1319     Updated: 09/24/23 1325     QT Interval 399 ms      QTC Interval 476 ms     Narrative:      HEART RATE= 85  bpm  RR Interval= 704  ms  VA Interval= 121  ms  P Horizontal Axis= 40  deg  P Front Axis= 39  deg  QRSD Interval= 158  ms  QT Interval= 399  ms  QTcB= 476  ms  QRS Axis= -32  deg  T Wave Axis= 115  deg  - ABNORMAL ECG -  Sinus rhythm  Vent pre-excitat'n(WPW), left acces'y pathway  Electronically Signed By:   Date and Time of Study: 2023-09-24 13:19:10            Imaging:  Imaging Results (Last 72 Hours)       Procedure Component Value Units Date/Time    XR Chest 1 View [[id number]] Collected: 09/24/23 0052     Updated: 09/24/23 0054    Narrative:      XR CHEST 1 VW    Date of Exam: 9/24/2023 12:26 AM EDT    Indication: soa    Comparison: None  available.    Findings:  There are no airspace consolidations. No pleural fluid. No pneumothorax. The pulmonary vasculature appears within normal limits. The cardiac and mediastinal silhouette appear unremarkable. No acute osseous abnormality identified.      Impression:      Impression:  No acute cardiopulmonary process.      Electronically Signed: Cyn Castro MD    9/24/2023 12:52 AM EDT    Workstation ID: [id number]              Dusty Mullins DO  09/24/23  21:19 EDT            Electronically signed by Dusty Mullins DO at 09/24/23 5295

## 2023-09-25 NOTE — PROGRESS NOTES
Cardiology [facility]      Patient Care Team:  Provider, No Known as PCP - General      REASON FOR CONSULTATION:  Wide-complex tachycardia     CHIEF COMPLAINT:  Palpitations     HISTORY OF PRESENT ILLNESS:   This is a 28-year-old male with no significant past medical history presented to the hospital with a complaint of palpitations.  His EKG in the emergency department demonstrated wide-complex tachycardia with a suspicion for either ventricular tachycardia or Mark-Parkinson-White.  The patient had been at a wedding earlier in the day and had a lot of alcoholic beverages.  The patient notes that he was very drunk.  In fact he went home after the wedding and vomited from all of the alcohol he consumed.  Shortly after this he began to experience palpitations.  He came to the emergency department for evaluation.     As noted above, the ER noted the patient in wide-complex tachycardia.  The patient's family noted that he had been worked up for Mark-Parkinson-White in the past but had not been diagnosed with this.  EKGs were analyzed and there was felt to be some intermittent delta waves and the patient was changed from an amiodarone infusion to a procainamide infusion.  Unfortunately, the procainamide was ineffective at controlling his rate or rhythm.  At the time of my examination this morning the patient continued to be in a rapid wide-complex rhythm intermittently.     EKG from this morning demonstrates atrial fibrillation with rapid ventricular response and intermittent aberrant conduction resulting in wide QRS complexes.  Patient reports intensified palpitations as well as some lightheadedness.     Multiple visits were made to the patient's bedside and room.  We discontinued his procainamide and initiated amiodarone first as a bolus but the later as an infusion.  We also tried metoprolol on 2 separate occasions without any significant improvement.  Ultimately we decided to cardiovert the patient because pharmacotherapy  was proving to be ineffective.  2D echocardiogram was also reviewed and demonstrated a mild cardiomyopathy likely secondary to profound tachycardia at the time of the examination.       Case was discussed with critical care medicine regarding treatment therapies and strategies.  Patient was becoming more symptomatic and hypotensive with continued therapies and arrhythmia.  Decision was made to proceed with DC cardioversion.       Patient denies any new complaints  Denies any further episodes of atrial fibrillation palpitations       Tmax is 97.9 pulse is 60 respirations are 10 blood pressure is 118/76 sats are 96% on room air          IMPRESSIONS  Atrial fibrillation with rapid ventricular response with preexcitation  Alcohol intoxication  Preexcitation  WPW syndrome  Atrial fibrillation with preexcitation requiring emergent a DC cardioversion  Presentation EKG shows atrial fibrillation with preexcitation with rapid ventricular response with a ventricular rate close to 200 bpm    RECOMMENDATIONS:  Rate controlling pharmacotherapy proved to be ineffective and the patient ultimately underwent DC cardioversion at the bedside.  Further recommendations as patient's course progresses  Discontinue IV amiodarone  EP evaluation for consideration for EP study and possible ablation for accessory pathway  Echocardiogram with LV ejection fraction of 40 to 45%  LV dysfunction could be driven by tachycardia  Further recommendations based on patient course      Chief Complaint: Palpitations    Subjective no new complaint    Interval History: No significant change in the overall status    History taken from: patient    Review of Systems:  Review of Systems   Constitutional: Negative for chills, decreased appetite and malaise/fatigue.   HENT:  Negative for congestion and nosebleeds.    Eyes:  Negative for blurred vision and double vision.   Cardiovascular:  Negative for chest pain, dyspnea on exertion, irregular heartbeat, leg  "swelling, near-syncope and orthopnea.   Respiratory:  Negative for cough and shortness of breath.    Hematologic/Lymphatic: Negative for adenopathy. Does not bruise/bleed easily.   Skin:  Negative for color change and rash.   Musculoskeletal:  Negative for back pain and joint pain.   Gastrointestinal:  Negative for bloating, abdominal pain, hematemesis and hematochezia.   Genitourinary:  Negative for flank pain and hematuria.   Neurological:  Negative for dizziness and focal weakness.     Objective    Vital Signs  Visit Vitals  /76   Pulse 63   Temp 97.9 °F (36.6 °C) (Oral)   Resp 11   Ht 190.5 cm (75\")   Wt 123 kg (271 lb 2.7 oz)   SpO2 96%   BMI 33.89 kg/m²     Oxygen Therapy  SpO2: 96 %  Pulse Oximetry Type: Continuous  Device (Oxygen Therapy): room air  Flow (L/min): 2  ETCO2 (mmHg): 35 mmHg  Flowsheet Rows      Flowsheet Row First Filed Value   Admission Height 190.5 cm (75\") Documented at 09/23/2023 2358   Admission Weight 124 kg (274 lb 0.5 oz) Documented at 09/23/2023 2358          Intake & Output (last 3 days)         09/22 0701  09/23 0700 09/23 0701  09/24 0700 09/24 0701  09/25 0700 09/25 0701  09/26 0700    Urine (mL/kg/hr)   2000 (0.7) 950 (1.1)    Total Output   2000 950    Net   -2000 -950                  Lines, Drains & Airways       Active LDAs       Name Placement date Placement time Site Days    Peripheral IV 09/24/23 0017 Right Antecubital 09/24/23  0017  Antecubital  1    Peripheral IV 09/24/23 0025 Left Antecubital 09/24/23  0025  Antecubital  1                    Physical Exam:  Physical Exam    Results Review:     I reviewed the patient's new clinical results.    Lab Results (last 24 hours)       Procedure Component Value Units Date/Time    Magnesium [[id number]]  (Normal) Collected: 09/25/23 0416    Specimen: Blood Updated: 09/25/23 0502     Magnesium 2.0 mg/dL     Comprehensive Metabolic Panel [[id number]]  (Abnormal) Collected: 09/25/23 0416    Specimen: Blood Updated: 09/25/23 0502 "     Glucose 105 mg/dL      BUN 13 mg/dL      Creatinine 0.97 mg/dL      Sodium 140 mmol/L      Potassium 4.1 mmol/L      Chloride 107 mmol/L      CO2 25.0 mmol/L      Calcium 8.9 mg/dL      Total Protein 5.7 g/dL      Albumin 3.7 g/dL      ALT (SGPT) 11 U/L      AST (SGOT) 14 U/L      Alkaline Phosphatase 53 U/L      Total Bilirubin 0.8 mg/dL      Globulin 2.0 gm/dL      A/G Ratio 1.9 g/dL      BUN/Creatinine Ratio 13.4     Anion Gap 8.0 mmol/L      eGFR 108.4 mL/min/1.73     Narrative:      GFR Normal >60  Chronic Kidney Disease <60  Kidney Failure <15      CBC & Differential [[id number]]  (Normal) Collected: 09/25/23 0416    Specimen: Blood Updated: 09/25/23 0446    Narrative:      The following orders were created for panel order CBC & Differential.  Procedure                               Abnormality         Status                     ---------                               -----------         ------                     CBC Auto Differential[[id number]]        Normal              Final result                 Please view results for these tests on the individual orders.    CBC Auto Differential [[id number]]  (Normal) Collected: 09/25/23 0416    Specimen: Blood Updated: 09/25/23 0446     WBC 5.40 10*3/mm3      RBC 4.73 10*6/mm3      Hemoglobin 15.0 g/dL      Hematocrit 42.2 %      MCV 89.3 fL      MCH 31.6 pg      MCHC 35.4 g/dL      RDW 13.0 %      RDW-SD 42.9 fl      MPV 7.8 fL      Platelets 181 10*3/mm3      Neutrophil % 61.1 %      Lymphocyte % 27.1 %      Monocyte % 7.4 %      Eosinophil % 3.5 %      Basophil % 0.9 %      Neutrophils, Absolute 3.30 10*3/mm3      Lymphocytes, Absolute 1.50 10*3/mm3      Monocytes, Absolute 0.40 10*3/mm3      Eosinophils, Absolute 0.20 10*3/mm3      Basophils, Absolute 0.10 10*3/mm3      nRBC 0.1 /100 WBC           Results for orders placed during the hospital encounter of 09/24/23    ADULT TRANSTHORACIC ECHO COMPLETE W/ CONT IF NECESSARY PER PROTOCOL    Interpretation Summary     Left ventricular systolic function is moderately decreased. Left ventricular ejection fraction appears to be 41 - 45%.        Medication Review:   I have reviewed the patient's current medication list  Scheduled Meds:sodium chloride, 10 mL, Intravenous, Q12H      Continuous Infusions:   PRN Meds:.  acetaminophen **OR** acetaminophen    Calcium Replacement - Follow Nurse / BPA Driven Protocol    Magnesium Low Dose Replacement - Follow Nurse / BPA Driven Protocol    naloxone    nitroglycerin    ondansetron **OR** ondansetron    Phosphorus Replacement - Follow Nurse / BPA Driven Protocol    Potassium Replacement - Follow Nurse / BPA Driven Protocol    sodium chloride    sodium chloride    ECG/EMG Results (last 24 hours)       Procedure Component Value Units Date/Time    ECG 12 Lead Drug Monitoring; Amiodarone [[id number]] Collected: 09/25/23 0543     Updated: 09/25/23 0548     QT Interval 438 ms      QTC Interval 452 ms     Narrative:      HEART RATE= 64  bpm  RR Interval= 940  ms  NJ Interval= 111  ms  P Horizontal Axis=   deg  P Front Axis= -3  deg  QRSD Interval= 161  ms  QT Interval= 438  ms  QTcB= 452  ms  QRS Axis= -33  deg  T Wave Axis= 104  deg  - ABNORMAL ECG -  Sinus rhythm  Vent pre-excitat'n(WPW), left acces'y pathway  When compared with ECG of 24-Sep-2023 13:19:10,  Significant rate decrease  Electronically Signed By:   Date and Time of Study: 2023-09-25 05:43:19            Imaging Results (Last 24 Hours)       ** No results found for the last 24 hours. **          No results found for this or any previous visit.     Results for orders placed during the hospital encounter of 09/24/23    ADULT TRANSTHORACIC ECHO COMPLETE W/ CONT IF NECESSARY PER PROTOCOL    Interpretation Summary    Left ventricular systolic function is moderately decreased. Left ventricular ejection fraction appears to be 41 - 45%.     Lab Results   Component Value Date    GLUCOSE 105 (H) 09/25/2023    BUN 13 09/25/2023    CREATININE 0.97  09/25/2023    EGFR 108.4 09/25/2023    BCR 13.4 09/25/2023    K 4.1 09/25/2023    CO2 25.0 09/25/2023    CALCIUM 8.9 09/25/2023    ALBUMIN 3.7 09/25/2023    BILITOT 0.8 09/25/2023    AST 14 09/25/2023    ALT 11 09/25/2023      Lab Results   Component Value Date    CHOL 157 09/24/2023    TRIG 105 09/24/2023    HDL 41 09/24/2023    LDL 97 09/24/2023      Lab Results   Component Value Date    TROPONINT <6 09/24/2023        Assessment & Plan       Ventricular tachycardia        Susy Crenshaw MD  09/25/23  13:49 EDT

## 2023-09-25 NOTE — PROCEDURES
Procedure:  Bedside DC cardioversion    Indications:  Atrial fibrillation with rapid ventricular response unresponsive to pharmacotherapy  Impending shock/hemodynamic instability    Complications:  None    :  Dr. Mullins    Anesthesia:  IV sedation provided by critical care medicine    Description of procedure:  The patient was attached to appropriate monitoring equipment as well as defibrillator pads.  After adequate IV sedation was achieved, a single 120 J shock was delivered.  This was unsuccessful at cardioversion.  Further IV sedation was administered.  Energy was increased to 200 J.  This converted the patient to sinus rhythm.

## 2023-09-25 NOTE — PLAN OF CARE
Goal Outcome Evaluation:      Pt rested throughout shift. Pt denied chest pain. Pt remained in NSR. All questions and concerns addressed.

## 2023-09-25 NOTE — PROGRESS NOTES
[facility] Medicine Services Daily Progress Note    Patient Name: Dieter Daugherty  : 1995  MRN: [id number]  Primary Care Physician:  Provider, No Known  Date of admission: 2023    Subjective      Chief Complaint: Ventricular tachycardia      Dieter Daugherty is a 28 y.o. male with no significant PMH presented to the hospital for palpitations, and was admitted with a principal diagnosis of Ventricular tachycardia.  Patient states he was at a wedding earlier today and had a few alcoholic beverages.  He states once he arrived home after the party he noticed long periods of palpitations.  He states when he was approximately 14 years old that he had a similar problem where he was told he had irregular heart rhythms.  He states he does not remember an actual name of the condition and the palpitations resolved so he did not continue to follow-up.  He states occasionally since then he would have intermittent episodes of palpitations but denies any chest pain.  At the time my assessment, patient denies chest pain, shortness of breath, syncope, or lightheadedness.  He states he can feel the palpitations when his heart rate gets approximately 200 bpm.  Of note, on 3/25/2014 there is a [facility] office visit note discussing Mark-Parkinson-White. I admitted patient to the intensive care unit for further evaluation and treatment.     ACP: Patient wishes to be full code with full intervention; his mother is his decision-maker if he is unable.     Patient was seen and examined on 23 at 04:45 EDT .  2023 patient seen and examined in bed no acute distress, vital signs stable, no new complaints, family at bedside, discussed with RN.    ROS Constitutional:  Negative for diaphoresis and fatigue.   HENT:  Negative for congestion and sore throat.    Eyes:  Negative for pain and redness.   Respiratory:  Negative for cough and shortness of breath.    Cardiovascular:  Positive for  palpitations. Negative for chest pain and leg swelling.   Gastrointestinal:  Negative for abdominal pain, nausea and vomiting.   Endocrine: Negative for polydipsia and polyphagia.   Genitourinary:  Negative for dysuria and flank pain.   Musculoskeletal:  Negative for back pain and joint swelling.   Skin:  Negative for color change and pallor.   Neurological:  Negative for dizziness and seizures.   Psychiatric/Behavioral:  Negative for behavioral problems and confusion.     Objective      Vitals:   Temp:  [98.1 °F (36.7 °C)-98.4 °F (36.9 °C)] 98.1 °F (36.7 °C)  Heart Rate:  [] 63  Resp:  [11-17] 11  BP: ()/(49-92) 118/76    Physical Exam Vitals and nursing note reviewed.   Constitutional:       Appearance: Normal appearance.   HENT:      Head: Normocephalic.      Nose: Nose normal.      Mouth/Throat:      Mouth: Mucous membranes are moist.      Pharynx: Oropharynx is clear.   Eyes:      Pupils: Pupils are equal, round, and reactive to light.   Cardiovascular:      Rate and Rhythm: Tachycardia present. Rhythm irregular.      Pulses: Normal pulses.      Heart sounds: Normal heart sounds.   Pulmonary:      Effort: Pulmonary effort is normal.      Breath sounds: Normal breath sounds.   Abdominal:      General: Bowel sounds are normal.      Palpations: Abdomen is soft.   Musculoskeletal:         General: Normal range of motion.      Cervical back: Normal range of motion.   Skin:     General: Skin is warm and dry.      Capillary Refill: Capillary refill takes 2 to 3 seconds.   Neurological:      Mental Status: He is alert and oriented to person, place, and time. Mental status is at baseline.   Psychiatric:         Mood and Affect: Mood normal.         Behavior: Behavior normal.         Thought Content: Thought content normal.         Judgment: Judgment normal.        Result Review    Result Review:  I have personally reviewed the results from the time of this admission to 9/25/2023 09:22 EDT and agree with  these findings:  []  Laboratory  []  Microbiology  []  Radiology  []  EKG/Telemetry   []  Cardiology/Vascular   []  Pathology  []  Old records  []  Other:  Most notable findings include:     Wounds (last 24 hours)       LDA Wound       Row Name 09/25/23 0830 09/25/23 0402 09/25/23 0355       Wound 09/25/23 0402 Left lower sternal Other (comment)    Wound - Properties Group Placement Date: 09/25/23  -AD Placement Time: 0402  -AD Present on Hospital Admission: N  -AD Side: Left  -AD Orientation: lower  -AD Location: sternal  -AD Primary Wound Type: Other  -AD Additional Comments: Rash from the defib. pad  -AD    Pressure Injury Stage -- O  -AD --    Closure -- Open to air  -AD --    Periwound -- redness  -AD --    Periwound Temperature -- warm  -AD --    Periwound Care -- moisturizer applied  -AD --    Retired Wound - Properties Group Placement Date: 09/25/23  -AD Placement Time: 0402 -AD Present on Hospital Admission: N  -AD Side: Left  -AD Orientation: lower  -AD Location: sternal  -AD Primary Wound Type: Other  -AD Additional Comments: Rash from the defib. pad  -AD    Retired Wound - Properties Group Date first assessed: 09/25/23  -AD Time first assessed: 0402  -AD Present on Hospital Admission: N  -AD Side: Left  -AD Location: sternal  -AD Primary Wound Type: Other  -AD Additional Comments: Rash from the defib. pad  -AD       Rash 09/24/23 0308 Right anterior foot papule    Rash - Properties Group Placement Date: 09/24/23  -JS Placement Time: 0308  -JS Side: Right  -JS Orientation: anterior  -JS Location: foot  -JS Type: papule  -JS    Distribution generalized  -MS -- generalized  -AD    Borders irregular  -MS -- irregular  -AD    Characteristics itching  -MS -- itching  -AD    Lesion Size less than 0.5 cm  -MS -- --    Retired Rash - Properties Group Placement Date: 09/24/23  -JS Placement Time: 0308  -JS Side: Right  -JS Orientation: anterior  -JS Location: foot  -JS Type: papule  -JS    Retired Rash -  Properties Group Date first assessed: 09/24/23  -JS Time first assessed: 0308  -JS Side: Right  -JS Orientation: anterior  -JS Location: foot  -JS Type: papule  -JS       Rash 09/24/23 0250 Left anterior foot patch    Rash - Properties Group Placement Date: 09/24/23  -JS Placement Time: 0250  -JS Side: Left  -JS Orientation: anterior  -JS Location: foot  -JS Type: patch  -JS    Distribution generalized  -MS -- generalized  -AD    Borders irregular  -MS -- irregular  -AD    Characteristics itching  -MS -- itching  -AD    Lesion Size less than 0.5 cm  -MS -- --    Retired Rash - Properties Group Placement Date: 09/24/23  -JS Placement Time: 0250  -JS Side: Left  -JS Orientation: anterior  -JS Location: foot  -JS Type: patch  -JS    Retired Rash - Properties Group Date first assessed: 09/24/23  -JS Time first assessed: 0250  -JS Side: Left  -JS Orientation: anterior  -JS Location: foot  -JS Type: patch  -JS      Row Name 09/25/23 0000 09/24/23 2015 09/24/23 1600       Rash 09/24/23 0308 Right anterior foot papule    Rash - Properties Group Placement Date: 09/24/23  -JS Placement Time: 0308  -JS Side: Right  -JS Orientation: anterior  -JS Location: foot  -JS Type: papule  -JS    Distribution generalized  -AD generalized  -MC generalized  -SE    Color red  -AD -- --    Configuration/Shape asymmetric  -AD -- --    Borders irregular  -AD irregular  -MC irregular  -SE    Characteristics itching  -AD itching  -MC itching  -SE    Lesion Size -- less than 0.5 cm  -MC less than 0.5 cm  -SE    Care, Rash open to air  -AD -- --    Retired Rash - Properties Group Placement Date: 09/24/23  -JS Placement Time: 0308  -JS Side: Right  -JS Orientation: anterior  -JS Location: foot  -JS Type: papule  -JS    Retired Rash - Properties Group Date first assessed: 09/24/23  -JS Time first assessed: 0308  -JS Side: Right  -JS Orientation: anterior  -JS Location: foot  -JS Type: papule  -JS       Rash 09/24/23 0250 Left anterior foot patch     Rash - Properties Group Placement Date: 09/24/23  -JS Placement Time: 0250  -JS Side: Left  -JS Orientation: anterior  -JS Location: foot  -JS Type: patch  -JS    Distribution generalized  -AD generalized  -MC generalized  -SE    Color red  -AD -- --    Configuration/Shape asymmetric  -AD -- --    Borders irregular  -AD irregular  -MC irregular  -SE    Characteristics itching  -AD itching  -MC itching  -SE    Lesion Size -- less than 0.5 cm  -MC less than 0.5 cm  -SE    Care, Rash open to air  -AD -- --    Retired Rash - Properties Group Placement Date: 09/24/23  -JS Placement Time: 0250  -JS Side: Left  -JS Orientation: anterior  -JS Location: foot  -JS Type: patch  -JS    Retired Rash - Properties Group Date first assessed: 09/24/23  -JS Time first assessed: 0250  -JS Side: Left  -JS Orientation: anterior  -JS Location: foot  -JS Type: patch  -JS      Row Name 09/24/23 1200             Rash 09/24/23 0308 Right anterior foot papule    Rash - Properties Group Placement Date: 09/24/23  -JS Placement Time: 0308  -JS Side: Right  -JS Orientation: anterior  -JS Location: foot  -JS Type: papule  -JS    Distribution generalized  -SE      Borders irregular  -SE      Characteristics itching  -SE      Lesion Size less than 0.5 cm  -SE      Retired Rash - Properties Group Placement Date: 09/24/23  -JS Placement Time: 0308  -JS Side: Right  -JS Orientation: anterior  -JS Location: foot  -JS Type: papule  -JS    Retired Rash - Properties Group Date first assessed: 09/24/23  -JS Time first assessed: 0308  -JS Side: Right  -JS Orientation: anterior  -JS Location: foot  -JS Type: papule  -JS       Rash 09/24/23 0250 Left anterior foot patch    Rash - Properties Group Placement Date: 09/24/23  -JS Placement Time: 0250  -JS Side: Left  -JS Orientation: anterior  -JS Location: foot  -JS Type: patch  -JS    Distribution generalized  -SE      Borders irregular  -SE      Characteristics itching  -SE      Lesion Size less than 0.5 cm   -SE      Retired Rash - Properties Group Placement Date: 09/24/23  -JS Placement Time: 0250  -JS Side: Left  -JS Orientation: anterior  -JS Location: foot  -JS Type: patch  -JS    Retired Rash - Properties Group Date first assessed: 09/24/23  -JS Time first assessed: 0250  -JS Side: Left  -JS Orientation: anterior  -JS Location: foot  -JS Type: patch  -JS              User Key  (r) = Recorded By, (t) = Taken By, (c) = Cosigned By      Initials Name Provider Type    Umm Wei, RN Registered Nurse    Yudelka Hyman RN Registered Nurse    Solomon Hayward, RN Registered Nurse    Evangelina Mendoza, RN Registered Nurse    Brooklynn Godoy, RN Registered Nurse                    CBC:      Lab 09/25/23 0416 09/24/23  0429 09/24/23  0035 09/24/23  0021   WBC 5.40 6.80  --  8.40   HEMOGLOBIN 15.0 15.6  --  16.7   HEMOGLOBIN, POC  --   --    < >  --    HEMATOCRIT 42.2 44.4  --  46.7   HEMATOCRIT POC  --   --    < >  --    PLATELETS 181 213  --  253   NEUTROS ABS 3.30 4.10  --  5.30   LYMPHS ABS 1.50 2.10  --  2.10   MONOS ABS 0.40 0.50  --  0.90   EOS ABS 0.20 0.00  --  0.10   MCV 89.3 89.4  --  89.1    < > = values in this interval not displayed.     CMP:        Lab 09/25/23 0416 09/24/23  0842 09/24/23  0035 09/24/23  0021   SODIUM 140 141  --  142   POTASSIUM 4.1 4.3  --  4.0   CHLORIDE 107 109*  --  105   CO2 25.0 20.0*  --  22.0   POC ANION GAP ISTAT  --   --  17.0  --    ANION GAP 8.0 12.0  --  15.0   BUN 13 15  --  17   CREATININE 0.97 0.88 1.60* 1.23   EGFR 108.4 119.4 59.4* 81.5   GLUCOSE 105* 101*  --  110*   CALCIUM 8.9 9.1  --  9.6   MAGNESIUM 2.0 2.0  --  2.3   TOTAL PROTEIN 5.7* 6.3  --  7.2   ALBUMIN 3.7 4.2  --  4.9   GLOBULIN 2.0 2.1  --  2.3   ALT (SGPT) 11 17  --  17   AST (SGOT) 14 23  --  21   BILIRUBIN 0.8 1.2  --  0.4   ALK PHOS 53 59  --  77     No results found for: ACANTHNAEG, AFBCX, BPERTUSSISCX, BLOODCX  No results found for: BCIDPCR, CXREFLEX, CSFCX, CULTURETIS  No  results found for: CULTURES, HSVCX, URCX  No results found for: EYECULTURE, GCCX, HSVCULTURE, LABHSV  No results found for: LEGIONELLA, MRSACX, MUMPSCX, MYCOPLASCX  No results found for: NOCARDIACX, STOOLCX  No results found for: THROATCX, UNSTIMCULT, URINECX, CULTURE, VZVCULTUR  No results found for: VIRALCULTU, WOUNDCX      Assessment & Plan      Brief Patient Summary:  Dieter Daugherty is a 28 y.o. male who presented to the hospital for palpitations, and was admitted with a principal diagnosis of Ventricular tachycardia.  Patient states he was at a wedding earlier today and had a few alcoholic beverages.  He states once he arrived home after the party he noticed long periods of palpitations.  He states when he was approximately 14 years old that he had a similar problem where he was told he had irregular heart rhythms.  He states he does not remember an actual name of the condition and the palpitations resolved so he did not continue to follow-up.  He states occasionally since then he would have intermittent episodes of palpitations but denies any chest pain.  At the time my assessment, patient denies chest pain, shortness of breath, syncope, or lightheadedness.  He states he can feel the palpitations when his heart rate gets approximately 200 bpm.  Of note, on 3/25/2014 there is a [facility] office visit note discussing Mark-Parkinson-White. I admitted patient to the intensive care unit for further evaluation and treatment.       sodium chloride, 10 mL, Intravenous, Q12H             Active Hospital Problems:  Active Hospital Problems    Diagnosis     **Ventricular tachycardia      Tachyarrhythmia/ Atrial fibrillation with rapid ventricular response unresponsive to pharmacotherapy  Impending shock/hemodynamic instability    -- Likely Mark-Parkinson-White  -Magnesium level 2.3  -Echo-    Left ventricular systolic function is moderately decreased. Left ventricular ejection fraction appears to be 41 -  45%.  - procainamide infusion dc  -s/p cardioversion  -Cardiology consulted:The patient was attached to appropriate monitoring equipment as well as defibrillator pads. After adequate IV sedation was achieved, a single 120 J shock was delivered. This was unsuccessful at cardioversion. Further IV sedation was administered. Energy was increased to 200 J. This converted the patient to sinus rhythm.     DVT prophylaxis:  Mechanical DVT prophylaxis orders are present.    CODE STATUS:    Code Status (Patient has no pulse and is not breathing): CPR (Attempt to Resuscitate)  Medical Interventions (Patient has pulse or is breathing): Full Support    Disposition:  I expect patient to be discharged home.    I have utilized all available, immediate resources to obtain, update, or review the patient's current medications including all prescriptions, over-the-counter products, herbals, cannabis/cannabidiol products, and vitamin/mineral/dietary (nutritional) supplements.    Code Status (Patient has no pulse and is not breathing): CPR (Attempt to Resuscitate)  Medical Interventions (Patient has pulse or is breathing): Full Support    Next of kin or Power of :     Admission Status:  I believe this patient meets admit status.    This patient has been examined wearing appropriate Personal Protective Equipment and discussed with  rn . 09/25/23    Electronically signed by Bhavin Berg MD, 09/25/23, 09:22 EDT.  [facility] Hospitalist Team

## 2023-09-26 ENCOUNTER — ANESTHESIA (OUTPATIENT)
Dept: CARDIOLOGY | Facility: HOSPITAL | Age: 28
DRG: 274 | End: 2023-09-26
Payer: COMMERCIAL

## 2023-09-26 ENCOUNTER — ANESTHESIA EVENT (OUTPATIENT)
Dept: CARDIOLOGY | Facility: HOSPITAL | Age: 28
DRG: 274 | End: 2023-09-26
Payer: COMMERCIAL

## 2023-09-26 VITALS
DIASTOLIC BLOOD PRESSURE: 76 MMHG | WEIGHT: 271.17 LBS | BODY MASS INDEX: 33.72 KG/M2 | HEIGHT: 75 IN | OXYGEN SATURATION: 96 % | RESPIRATION RATE: 21 BRPM | HEART RATE: 90 BPM | SYSTOLIC BLOOD PRESSURE: 123 MMHG | TEMPERATURE: 96.9 F

## 2023-09-26 PROBLEM — I47.20 VENTRICULAR TACHYCARDIA: Status: RESOLVED | Noted: 2023-09-24 | Resolved: 2023-09-26

## 2023-09-26 PROBLEM — I48.91 ATRIAL FIBRILLATION WITH RVR: Status: ACTIVE | Noted: 2023-09-23

## 2023-09-26 LAB
ACT BLD: 137 SECONDS (ref 89–137)
ACT BLD: 197 SECONDS (ref 89–137)
ACT BLD: 209 SECONDS (ref 89–137)
ACT BLD: 233 SECONDS (ref 89–137)
ALBUMIN SERPL-MCNC: 3.9 G/DL (ref 3.5–5.2)
ALBUMIN/GLOB SERPL: 2.1 G/DL
ALP SERPL-CCNC: 65 U/L (ref 39–117)
ALT SERPL W P-5'-P-CCNC: 10 U/L (ref 1–41)
ANION GAP SERPL CALCULATED.3IONS-SCNC: 11 MMOL/L (ref 5–15)
AST SERPL-CCNC: 14 U/L (ref 1–40)
BASOPHILS # BLD AUTO: 0 10*3/MM3 (ref 0–0.2)
BASOPHILS NFR BLD AUTO: 0.7 % (ref 0–1.5)
BILIRUB SERPL-MCNC: 0.7 MG/DL (ref 0–1.2)
BUN SERPL-MCNC: 14 MG/DL (ref 6–20)
BUN/CREAT SERPL: 15.4 (ref 7–25)
CALCIUM SPEC-SCNC: 9.1 MG/DL (ref 8.6–10.5)
CHLORIDE SERPL-SCNC: 106 MMOL/L (ref 98–107)
CO2 SERPL-SCNC: 24 MMOL/L (ref 22–29)
CREAT SERPL-MCNC: 0.91 MG/DL (ref 0.76–1.27)
DEPRECATED RDW RBC AUTO: 42.9 FL (ref 37–54)
EGFRCR SERPLBLD CKD-EPI 2021: 117 ML/MIN/1.73
EOSINOPHIL # BLD AUTO: 0.2 10*3/MM3 (ref 0–0.4)
EOSINOPHIL NFR BLD AUTO: 2.9 % (ref 0.3–6.2)
ERYTHROCYTE [DISTWIDTH] IN BLOOD BY AUTOMATED COUNT: 13 % (ref 12.3–15.4)
GLOBULIN UR ELPH-MCNC: 1.9 GM/DL
GLUCOSE SERPL-MCNC: 99 MG/DL (ref 65–99)
HCT VFR BLD AUTO: 42.3 % (ref 37.5–51)
HGB BLD-MCNC: 15 G/DL (ref 13–17.7)
LYMPHOCYTES # BLD AUTO: 1.3 10*3/MM3 (ref 0.7–3.1)
LYMPHOCYTES NFR BLD AUTO: 22.2 % (ref 19.6–45.3)
MAGNESIUM SERPL-MCNC: 2 MG/DL (ref 1.6–2.6)
MCH RBC QN AUTO: 31.8 PG (ref 26.6–33)
MCHC RBC AUTO-ENTMCNC: 35.4 G/DL (ref 31.5–35.7)
MCV RBC AUTO: 89.7 FL (ref 79–97)
MONOCYTES # BLD AUTO: 0.5 10*3/MM3 (ref 0.1–0.9)
MONOCYTES NFR BLD AUTO: 9.1 % (ref 5–12)
NEUTROPHILS NFR BLD AUTO: 3.8 10*3/MM3 (ref 1.7–7)
NEUTROPHILS NFR BLD AUTO: 65.1 % (ref 42.7–76)
NRBC BLD AUTO-RTO: 0.1 /100 WBC (ref 0–0.2)
PLATELET # BLD AUTO: 165 10*3/MM3 (ref 140–450)
PMV BLD AUTO: 7.9 FL (ref 6–12)
POTASSIUM SERPL-SCNC: 3.9 MMOL/L (ref 3.5–5.2)
PROT SERPL-MCNC: 5.8 G/DL (ref 6–8.5)
QT INTERVAL: 395 MS
QTC INTERVAL: 450 MS
RBC # BLD AUTO: 4.72 10*6/MM3 (ref 4.14–5.8)
SODIUM SERPL-SCNC: 141 MMOL/L (ref 136–145)
WBC NRBC COR # BLD: 5.9 10*3/MM3 (ref 3.4–10.8)

## 2023-09-26 PROCEDURE — 25010000002 PROPOFOL 200 MG/20ML EMULSION: Performed by: NURSE ANESTHETIST, CERTIFIED REGISTERED

## 2023-09-26 PROCEDURE — 25010000002 PHENYLEPHRINE 10 MG/ML SOLUTION 5 ML VIAL: Performed by: NURSE ANESTHETIST, CERTIFIED REGISTERED

## 2023-09-26 PROCEDURE — 02583ZZ DESTRUCTION OF CONDUCTION MECHANISM, PERCUTANEOUS APPROACH: ICD-10-PCS | Performed by: INTERNAL MEDICINE

## 2023-09-26 PROCEDURE — 83735 ASSAY OF MAGNESIUM: CPT | Performed by: STUDENT IN AN ORGANIZED HEALTH CARE EDUCATION/TRAINING PROGRAM

## 2023-09-26 PROCEDURE — C1894 INTRO/SHEATH, NON-LASER: HCPCS | Performed by: INTERNAL MEDICINE

## 2023-09-26 PROCEDURE — 85025 COMPLETE CBC W/AUTO DIFF WBC: CPT | Performed by: STUDENT IN AN ORGANIZED HEALTH CARE EDUCATION/TRAINING PROGRAM

## 2023-09-26 PROCEDURE — 93653 COMPRE EP EVAL TX SVT: CPT | Performed by: INTERNAL MEDICINE

## 2023-09-26 PROCEDURE — 4A0234Z MEASUREMENT OF CARDIAC ELECTRICAL ACTIVITY, PERCUTANEOUS APPROACH: ICD-10-PCS | Performed by: INTERNAL MEDICINE

## 2023-09-26 PROCEDURE — 25010000002 FENTANYL CITRATE (PF) 100 MCG/2ML SOLUTION: Performed by: NURSE ANESTHETIST, CERTIFIED REGISTERED

## 2023-09-26 PROCEDURE — C1730 CATH, EP, 19 OR FEW ELECT: HCPCS | Performed by: INTERNAL MEDICINE

## 2023-09-26 PROCEDURE — 02K83ZZ MAP CONDUCTION MECHANISM, PERCUTANEOUS APPROACH: ICD-10-PCS | Performed by: INTERNAL MEDICINE

## 2023-09-26 PROCEDURE — 25010000002 MIDAZOLAM PER 1 MG: Performed by: NURSE ANESTHETIST, CERTIFIED REGISTERED

## 2023-09-26 PROCEDURE — 25010000002 PROPOFOL 1000 MG/100ML EMULSION: Performed by: NURSE ANESTHETIST, CERTIFIED REGISTERED

## 2023-09-26 PROCEDURE — 93005 ELECTROCARDIOGRAM TRACING: CPT | Performed by: INTERNAL MEDICINE

## 2023-09-26 PROCEDURE — 80053 COMPREHEN METABOLIC PANEL: CPT | Performed by: STUDENT IN AN ORGANIZED HEALTH CARE EDUCATION/TRAINING PROGRAM

## 2023-09-26 PROCEDURE — 25010000002 HEPARIN (PORCINE) PER 1000 UNITS: Performed by: NURSE ANESTHETIST, CERTIFIED REGISTERED

## 2023-09-26 PROCEDURE — 25010000002 ONDANSETRON PER 1 MG: Performed by: STUDENT IN AN ORGANIZED HEALTH CARE EDUCATION/TRAINING PROGRAM

## 2023-09-26 PROCEDURE — 85347 COAGULATION TIME ACTIVATED: CPT

## 2023-09-26 PROCEDURE — 36415 COLL VENOUS BLD VENIPUNCTURE: CPT | Performed by: STUDENT IN AN ORGANIZED HEALTH CARE EDUCATION/TRAINING PROGRAM

## 2023-09-26 PROCEDURE — C1732 CATH, EP, DIAG/ABL, 3D/VECT: HCPCS | Performed by: INTERNAL MEDICINE

## 2023-09-26 RX ORDER — METOPROLOL SUCCINATE 50 MG/1
50 TABLET, EXTENDED RELEASE ORAL
Status: DISCONTINUED | OUTPATIENT
Start: 2023-09-26 | End: 2023-09-26 | Stop reason: HOSPADM

## 2023-09-26 RX ORDER — ONDANSETRON 2 MG/ML
4 INJECTION INTRAMUSCULAR; INTRAVENOUS ONCE AS NEEDED
Status: DISCONTINUED | OUTPATIENT
Start: 2023-09-26 | End: 2023-09-26 | Stop reason: HOSPADM

## 2023-09-26 RX ORDER — EPHEDRINE SULFATE 5 MG/ML
5 INJECTION INTRAVENOUS ONCE AS NEEDED
Status: DISCONTINUED | OUTPATIENT
Start: 2023-09-26 | End: 2023-09-26 | Stop reason: HOSPADM

## 2023-09-26 RX ORDER — SODIUM CHLORIDE 9 MG/ML
40 INJECTION, SOLUTION INTRAVENOUS AS NEEDED
Status: DISCONTINUED | OUTPATIENT
Start: 2023-09-26 | End: 2023-09-26 | Stop reason: SDUPTHER

## 2023-09-26 RX ORDER — DIPHENHYDRAMINE HYDROCHLORIDE 50 MG/ML
12.5 INJECTION INTRAMUSCULAR; INTRAVENOUS
Status: DISCONTINUED | OUTPATIENT
Start: 2023-09-26 | End: 2023-09-26 | Stop reason: HOSPADM

## 2023-09-26 RX ORDER — NALOXONE HCL 0.4 MG/ML
0.4 VIAL (ML) INJECTION AS NEEDED
Status: DISCONTINUED | OUTPATIENT
Start: 2023-09-26 | End: 2023-09-26 | Stop reason: HOSPADM

## 2023-09-26 RX ORDER — DEXMEDETOMIDINE HYDROCHLORIDE 100 UG/ML
INJECTION, SOLUTION INTRAVENOUS AS NEEDED
Status: DISCONTINUED | OUTPATIENT
Start: 2023-09-26 | End: 2023-09-26 | Stop reason: SURG

## 2023-09-26 RX ORDER — DIPHENHYDRAMINE HYDROCHLORIDE 50 MG/ML
12.5 INJECTION INTRAMUSCULAR; INTRAVENOUS ONCE AS NEEDED
Status: DISCONTINUED | OUTPATIENT
Start: 2023-09-26 | End: 2023-09-26 | Stop reason: HOSPADM

## 2023-09-26 RX ORDER — HYDROCODONE BITARTRATE AND ACETAMINOPHEN 5; 325 MG/1; MG/1
1 TABLET ORAL EVERY 4 HOURS PRN
Status: DISCONTINUED | OUTPATIENT
Start: 2023-09-26 | End: 2023-09-26 | Stop reason: HOSPADM

## 2023-09-26 RX ORDER — PROPOFOL 10 MG/ML
INJECTION, EMULSION INTRAVENOUS AS NEEDED
Status: DISCONTINUED | OUTPATIENT
Start: 2023-09-26 | End: 2023-09-26 | Stop reason: SURG

## 2023-09-26 RX ORDER — SODIUM CHLORIDE 0.9 % (FLUSH) 0.9 %
3 SYRINGE (ML) INJECTION EVERY 12 HOURS SCHEDULED
Status: DISCONTINUED | OUTPATIENT
Start: 2023-09-26 | End: 2023-09-26 | Stop reason: HOSPADM

## 2023-09-26 RX ORDER — OXYCODONE HYDROCHLORIDE 5 MG/1
10 TABLET ORAL EVERY 4 HOURS PRN
Status: DISCONTINUED | OUTPATIENT
Start: 2023-09-26 | End: 2023-09-26 | Stop reason: HOSPADM

## 2023-09-26 RX ORDER — METOPROLOL SUCCINATE 50 MG/1
50 TABLET, EXTENDED RELEASE ORAL
Qty: 30 TABLET | Refills: 3 | Status: SHIPPED | OUTPATIENT
Start: 2023-09-27

## 2023-09-26 RX ORDER — PROPOFOL 10 MG/ML
INJECTION, EMULSION INTRAVENOUS CONTINUOUS PRN
Status: DISCONTINUED | OUTPATIENT
Start: 2023-09-26 | End: 2023-09-26 | Stop reason: SURG

## 2023-09-26 RX ORDER — OXYCODONE HYDROCHLORIDE 5 MG/1
5 TABLET ORAL ONCE AS NEEDED
Status: DISCONTINUED | OUTPATIENT
Start: 2023-09-26 | End: 2023-09-26 | Stop reason: HOSPADM

## 2023-09-26 RX ORDER — ASPIRIN 325 MG
325 TABLET, DELAYED RELEASE (ENTERIC COATED) ORAL DAILY
Qty: 90 TABLET | Refills: 0 | Status: SHIPPED | OUTPATIENT
Start: 2023-09-26

## 2023-09-26 RX ORDER — FENTANYL CITRATE 50 UG/ML
INJECTION, SOLUTION INTRAMUSCULAR; INTRAVENOUS AS NEEDED
Status: DISCONTINUED | OUTPATIENT
Start: 2023-09-26 | End: 2023-09-26 | Stop reason: SURG

## 2023-09-26 RX ORDER — KETAMINE HCL IN NACL, ISO-OSM 100MG/10ML
SYRINGE (ML) INJECTION AS NEEDED
Status: DISCONTINUED | OUTPATIENT
Start: 2023-09-26 | End: 2023-09-26 | Stop reason: SURG

## 2023-09-26 RX ORDER — HEPARIN SODIUM 1000 [USP'U]/ML
INJECTION, SOLUTION INTRAVENOUS; SUBCUTANEOUS AS NEEDED
Status: DISCONTINUED | OUTPATIENT
Start: 2023-09-26 | End: 2023-09-26 | Stop reason: SURG

## 2023-09-26 RX ORDER — IPRATROPIUM BROMIDE AND ALBUTEROL SULFATE 2.5; .5 MG/3ML; MG/3ML
3 SOLUTION RESPIRATORY (INHALATION) ONCE AS NEEDED
Status: DISCONTINUED | OUTPATIENT
Start: 2023-09-26 | End: 2023-09-26 | Stop reason: HOSPADM

## 2023-09-26 RX ORDER — HYDRALAZINE HYDROCHLORIDE 20 MG/ML
5 INJECTION INTRAMUSCULAR; INTRAVENOUS
Status: DISCONTINUED | OUTPATIENT
Start: 2023-09-26 | End: 2023-09-26 | Stop reason: HOSPADM

## 2023-09-26 RX ORDER — SODIUM CHLORIDE 9 MG/ML
INJECTION, SOLUTION INTRAVENOUS CONTINUOUS PRN
Status: DISCONTINUED | OUTPATIENT
Start: 2023-09-26 | End: 2023-09-26 | Stop reason: SURG

## 2023-09-26 RX ORDER — LABETALOL HYDROCHLORIDE 5 MG/ML
5 INJECTION, SOLUTION INTRAVENOUS
Status: DISCONTINUED | OUTPATIENT
Start: 2023-09-26 | End: 2023-09-26 | Stop reason: HOSPADM

## 2023-09-26 RX ORDER — ASPIRIN 325 MG
325 TABLET, DELAYED RELEASE (ENTERIC COATED) ORAL EVERY 6 HOURS PRN
Qty: 90 TABLET | Refills: 0 | Status: SHIPPED | OUTPATIENT
Start: 2023-09-26 | End: 2023-09-26 | Stop reason: SDUPTHER

## 2023-09-26 RX ORDER — SODIUM CHLORIDE 0.9 % (FLUSH) 0.9 %
3-10 SYRINGE (ML) INJECTION AS NEEDED
Status: DISCONTINUED | OUTPATIENT
Start: 2023-09-26 | End: 2023-09-26 | Stop reason: HOSPADM

## 2023-09-26 RX ORDER — FLUMAZENIL 0.1 MG/ML
0.2 INJECTION INTRAVENOUS AS NEEDED
Status: DISCONTINUED | OUTPATIENT
Start: 2023-09-26 | End: 2023-09-26 | Stop reason: HOSPADM

## 2023-09-26 RX ORDER — MIDAZOLAM HYDROCHLORIDE 1 MG/ML
INJECTION INTRAMUSCULAR; INTRAVENOUS AS NEEDED
Status: DISCONTINUED | OUTPATIENT
Start: 2023-09-26 | End: 2023-09-26 | Stop reason: SURG

## 2023-09-26 RX ORDER — ONDANSETRON 2 MG/ML
4 INJECTION INTRAMUSCULAR; INTRAVENOUS EVERY 6 HOURS PRN
Status: DISCONTINUED | OUTPATIENT
Start: 2023-09-26 | End: 2023-09-26 | Stop reason: HOSPADM

## 2023-09-26 RX ADMIN — PROPOFOL 20 MG: 10 INJECTION, EMULSION INTRAVENOUS at 08:58

## 2023-09-26 RX ADMIN — MIDAZOLAM 2 MG: 1 INJECTION INTRAMUSCULAR; INTRAVENOUS at 08:36

## 2023-09-26 RX ADMIN — HEPARIN SODIUM 5000 UNITS: 1000 INJECTION INTRAVENOUS; SUBCUTANEOUS at 09:58

## 2023-09-26 RX ADMIN — DEXMEDETOMIDINE HYDROCHLORIDE 6 MCG: 100 INJECTION, SOLUTION INTRAVENOUS at 10:17

## 2023-09-26 RX ADMIN — DEXMEDETOMIDINE HYDROCHLORIDE 6 MCG: 100 INJECTION, SOLUTION INTRAVENOUS at 08:49

## 2023-09-26 RX ADMIN — PHENYLEPHRINE HYDROCHLORIDE 0.2 MCG/KG/MIN: 10 INJECTION INTRAVENOUS at 09:17

## 2023-09-26 RX ADMIN — FENTANYL CITRATE 25 MCG: 50 INJECTION, SOLUTION INTRAMUSCULAR; INTRAVENOUS at 10:20

## 2023-09-26 RX ADMIN — METOPROLOL SUCCINATE 50 MG: 50 TABLET, EXTENDED RELEASE ORAL at 16:20

## 2023-09-26 RX ADMIN — PROPOFOL INJECTABLE EMULSION 100 MCG/KG/MIN: 10 INJECTION, EMULSION INTRAVENOUS at 08:42

## 2023-09-26 RX ADMIN — DEXMEDETOMIDINE HYDROCHLORIDE 4 MCG: 100 INJECTION, SOLUTION INTRAVENOUS at 08:42

## 2023-09-26 RX ADMIN — PROPOFOL 10 MG: 10 INJECTION, EMULSION INTRAVENOUS at 08:46

## 2023-09-26 RX ADMIN — PROPOFOL 30 MG: 10 INJECTION, EMULSION INTRAVENOUS at 08:42

## 2023-09-26 RX ADMIN — Medication 20 MG: at 08:47

## 2023-09-26 RX ADMIN — SODIUM CHLORIDE: 9 INJECTION, SOLUTION INTRAVENOUS at 08:31

## 2023-09-26 RX ADMIN — DEXMEDETOMIDINE HYDROCHLORIDE 4 MCG: 100 INJECTION, SOLUTION INTRAVENOUS at 09:04

## 2023-09-26 RX ADMIN — HEPARIN SODIUM 3000 UNITS: 1000 INJECTION INTRAVENOUS; SUBCUTANEOUS at 10:14

## 2023-09-26 RX ADMIN — FENTANYL CITRATE 25 MCG: 50 INJECTION, SOLUTION INTRAMUSCULAR; INTRAVENOUS at 08:42

## 2023-09-26 RX ADMIN — HEPARIN SODIUM 5000 UNITS: 1000 INJECTION INTRAVENOUS; SUBCUTANEOUS at 09:55

## 2023-09-26 RX ADMIN — PROPOFOL 20 MG: 10 INJECTION, EMULSION INTRAVENOUS at 08:47

## 2023-09-26 RX ADMIN — ONDANSETRON 4 MG: 2 INJECTION INTRAMUSCULAR; INTRAVENOUS at 13:30

## 2023-09-26 RX ADMIN — FENTANYL CITRATE 25 MCG: 50 INJECTION, SOLUTION INTRAMUSCULAR; INTRAVENOUS at 08:46

## 2023-09-26 NOTE — ANESTHESIA PREPROCEDURE EVALUATION
Anesthesia Evaluation     Patient summary reviewed and Nursing notes reviewed   no history of anesthetic complications:   NPO Solid Status: > 8 hours  NPO Liquid Status: > 8 hours           Airway   Mallampati: II  TM distance: >3 FB  Neck ROM: full  Dental - normal exam     Pulmonary - normal exam   (+) a smoker,  Cardiovascular - normal exam  Exercise tolerance: good (4-7 METS)    ECG reviewed    (+) dysrhythmias Tachycardia    ROS comment: WPW  EF 40% upon admission     Neuro/Psych- negative ROS  GI/Hepatic/Renal/Endo - negative ROS     Musculoskeletal (-) negative ROS    Abdominal    Substance History - negative use     OB/GYN          Other - negative ROS                       Anesthesia Plan    ASA 3     general and MAC   total IV anesthesia  intravenous induction     Anesthetic plan, risks, benefits, and alternatives have been provided, discussed and informed consent has been obtained with: patient.    Plan discussed with CRNA and CAA.    CODE STATUS:    Code Status (Patient has no pulse and is not breathing): CPR (Attempt to Resuscitate)  Medical Interventions (Patient has pulse or is breathing): Full Support

## 2023-09-26 NOTE — PROGRESS NOTES
Cardiology [facility]      Patient Care Team:  Provider, No Known as PCP - General      REASON FOR CONSULTATION:  Wide-complex tachycardia     CHIEF COMPLAINT:  Palpitations     HISTORY OF PRESENT ILLNESS:   This is a 28-year-old male with no significant past medical history presented to the hospital with a complaint of palpitations.  His EKG in the emergency department demonstrated wide-complex tachycardia with a suspicion for either ventricular tachycardia or Mark-Parkinson-White.  The patient had been at a wedding earlier in the day and had a lot of alcoholic beverages.  The patient notes that he was very drunk.  In fact he went home after the wedding and vomited from all of the alcohol he consumed.  Shortly after this he began to experience palpitations.  He came to the emergency department for evaluation.     As noted above, the ER noted the patient in wide-complex tachycardia.  The patient's family noted that he had been worked up for Mark-Parkinson-White in the past but had not been diagnosed with this.  EKGs were analyzed and there was felt to be some intermittent delta waves and the patient was changed from an amiodarone infusion to a procainamide infusion.  Unfortunately, the procainamide was ineffective at controlling his rate or rhythm.  At the time of my examination this morning the patient continued to be in a rapid wide-complex rhythm intermittently.     EKG from this morning demonstrates atrial fibrillation with rapid ventricular response and intermittent aberrant conduction resulting in wide QRS complexes.  Patient reports intensified palpitations as well as some lightheadedness.     Multiple visits were made to the patient's bedside and room.  We discontinued his procainamide and initiated amiodarone first as a bolus but the later as an infusion.  We also tried metoprolol on 2 separate occasions without any significant improvement.  Ultimately we decided to cardiovert the patient because pharmacotherapy  was proving to be ineffective.  2D echocardiogram was also reviewed and demonstrated a mild cardiomyopathy likely secondary to profound tachycardia at the time of the examination.       Case was discussed with critical care medicine regarding treatment therapies and strategies.  Patient was becoming more symptomatic and hypotensive with continued therapies and arrhythmia.  Decision was made to proceed with DC cardioversion.       Patient denies any new complaints  Denies any further episodes of atrial fibrillation palpitations       Tmax is 97.9 pulse is 60 respirations are 10 blood pressure is 118/76 sats are 96% on room air  Sodium is 141 potassium is 3.9 creatinine is 0.9 LFTs are normal hemoglobin is 15        IMPRESSIONS  Atrial fibrillation with rapid ventricular response with preexcitation  Alcohol intoxication  Preexcitation  WPW syndrome  Atrial fibrillation with preexcitation requiring emergent a DC cardioversion  Presentation EKG shows atrial fibrillation with preexcitation with rapid ventricular response with a ventricular rate close to 200 bpm    RECOMMENDATIONS:  Rate controlling pharmacotherapy proved to be ineffective and the patient ultimately underwent DC cardioversion at the bedside.  Further recommendations as patient's course progresses  Discontinue IV amiodarone  EP evaluation for consideration for EP study and possible ablation for accessory pathway  Echocardiogram with LV ejection fraction of 40 to 45%  LV dysfunction could be driven by tachycardia  Plans for EP evaluation and ablation therapy today  Further recommendations based on patient course      Chief Complaint: Palpitations    Subjective no new complaint    Interval History: No significant change in the overall status    History taken from: patient    Review of Systems:  Review of Systems   Constitutional: Negative for chills, decreased appetite and malaise/fatigue.   HENT:  Negative for congestion and nosebleeds.    Eyes:  Negative  "for blurred vision and double vision.   Cardiovascular:  Negative for chest pain, dyspnea on exertion, irregular heartbeat, leg swelling, near-syncope and orthopnea.   Respiratory:  Negative for cough and shortness of breath.    Hematologic/Lymphatic: Negative for adenopathy. Does not bruise/bleed easily.   Skin:  Negative for color change and rash.   Musculoskeletal:  Negative for back pain and joint pain.   Gastrointestinal:  Negative for bloating, abdominal pain, hematemesis and hematochezia.   Genitourinary:  Negative for flank pain and hematuria.   Neurological:  Negative for dizziness and focal weakness.   Psychiatric/Behavioral:  Negative for altered mental status. The patient does not have insomnia.      Objective    Vital Signs  Visit Vitals  /71   Pulse 76   Temp 98.4 °F (36.9 °C) (Oral)   Resp 18   Ht 190.5 cm (75\")   Wt 123 kg (271 lb 2.7 oz)   SpO2 96%   BMI 33.89 kg/m²     Oxygen Therapy  SpO2: 96 %  Pulse Oximetry Type: Continuous  Device (Oxygen Therapy): room air  Flow (L/min): 2  ETCO2 (mmHg): 35 mmHg  Flowsheet Rows      Flowsheet Row First Filed Value   Admission Height 190.5 cm (75\") Documented at 09/23/2023 2358   Admission Weight 124 kg (274 lb 0.5 oz) Documented at 09/23/2023 2358          Intake & Output (last 3 days)         09/23 0701  09/24 0700 09/24 0701  09/25 0700 09/25 0701  09/26 0700 09/26 0701  09/27 0700    I.V. (mL/kg)    800 (6.5)    Total Intake(mL/kg)    800 (6.5)    Urine (mL/kg/hr)  2000 (0.7) 2450 (0.8) 1500 (2.7)    Total Output  2000 2450 1500    Net  -2000 -2450 -700                  Lines, Drains & Airways       Active LDAs       Name Placement date Placement time Site Days    Peripheral IV 09/24/23 0017 Right Antecubital 09/24/23  0017  Antecubital  1    Peripheral IV 09/24/23 0025 Left Antecubital 09/24/23  0025  Antecubital  1                    Physical Exam:  Physical Exam    Results Review:     I reviewed the patient's new clinical results.    Lab Results " (last 24 hours)       Procedure Component Value Units Date/Time    Magnesium [[id number]]  (Normal) Collected: 09/26/23 0441    Specimen: Blood Updated: 09/26/23 0535     Magnesium 2.0 mg/dL     Comprehensive Metabolic Panel [[id number]]  (Abnormal) Collected: 09/26/23 0441    Specimen: Blood Updated: 09/26/23 0535     Glucose 99 mg/dL      BUN 14 mg/dL      Creatinine 0.91 mg/dL      Sodium 141 mmol/L      Potassium 3.9 mmol/L      Chloride 106 mmol/L      CO2 24.0 mmol/L      Calcium 9.1 mg/dL      Total Protein 5.8 g/dL      Albumin 3.9 g/dL      ALT (SGPT) 10 U/L      AST (SGOT) 14 U/L      Alkaline Phosphatase 65 U/L      Total Bilirubin 0.7 mg/dL      Globulin 1.9 gm/dL      A/G Ratio 2.1 g/dL      BUN/Creatinine Ratio 15.4     Anion Gap 11.0 mmol/L      eGFR 117.0 mL/min/1.73     Narrative:      GFR Normal >60  Chronic Kidney Disease <60  Kidney Failure <15      CBC & Differential [[id number]]  (Normal) Collected: 09/26/23 0441    Specimen: Blood Updated: 09/26/23 0512    Narrative:      The following orders were created for panel order CBC & Differential.  Procedure                               Abnormality         Status                     ---------                               -----------         ------                     CBC Auto Differential[[id number]]        Normal              Final result                 Please view results for these tests on the individual orders.    CBC Auto Differential [[id number]]  (Normal) Collected: 09/26/23 0441    Specimen: Blood Updated: 09/26/23 0512     WBC 5.90 10*3/mm3      RBC 4.72 10*6/mm3      Hemoglobin 15.0 g/dL      Hematocrit 42.3 %      MCV 89.7 fL      MCH 31.8 pg      MCHC 35.4 g/dL      RDW 13.0 %      RDW-SD 42.9 fl      MPV 7.9 fL      Platelets 165 10*3/mm3      Neutrophil % 65.1 %      Lymphocyte % 22.2 %      Monocyte % 9.1 %      Eosinophil % 2.9 %      Basophil % 0.7 %      Neutrophils, Absolute 3.80 10*3/mm3      Lymphocytes, Absolute 1.30 10*3/mm3       Monocytes, Absolute 0.50 10*3/mm3      Eosinophils, Absolute 0.20 10*3/mm3      Basophils, Absolute 0.00 10*3/mm3      nRBC 0.1 /100 WBC           Results for orders placed during the hospital encounter of 09/24/23    ADULT TRANSTHORACIC ECHO COMPLETE W/ CONT IF NECESSARY PER PROTOCOL    Interpretation Summary    Left ventricular systolic function is moderately decreased. Left ventricular ejection fraction appears to be 41 - 45%.        Medication Review:   I have reviewed the patient's current medication list  Scheduled Meds:[MAR Hold] sodium chloride, 10 mL, Intravenous, Q12H  sodium chloride, 3 mL, Intravenous, Q12H      Continuous Infusions:   PRN Meds:.  [MAR Hold] acetaminophen **OR** [MAR Hold] acetaminophen    atropine    [MAR Hold] Calcium Replacement - Follow Nurse / BPA Driven Protocol    diphenhydrAMINE    diphenhydrAMINE    ePHEDrine Sulfate (Pressors)    flumazenil    hydrALAZINE    HYDROcodone-acetaminophen    HYDROmorphone    HYDROmorphone    ipratropium-albuterol    labetalol    lidocaine (cardiac)    [MAR Hold] Magnesium Low Dose Replacement - Follow Nurse / BPA Driven Protocol    [MAR Hold] naloxone    naloxone    [MAR Hold] neomycin-bacitracin-polymyxin b    [MAR Hold] nitroglycerin    [MAR Hold] ondansetron **OR** [MAR Hold] ondansetron    ondansetron    ondansetron    oxyCODONE    oxyCODONE    [MAR Hold] Phosphorus Replacement - Follow Nurse / BPA Driven Protocol    Potassium Replacement - Follow Nurse / BPA Driven Protocol    [MAR Hold] sodium chloride    sodium chloride    [MAR Hold] sodium chloride    ECG/EMG Results (last 24 hours)       Procedure Component Value Units Date/Time    ECG 12 Lead Drug Monitoring; Amiodarone [[id number]] Collected: 09/25/23 0543     Updated: 09/25/23 0548     QT Interval 438 ms      QTC Interval 452 ms     Narrative:      HEART RATE= 64  bpm  RR Interval= 940  ms  FL Interval= 111  ms  P Horizontal Axis=   deg  P Front Axis= -3  deg  QRSD Interval= 161  ms  QT  Interval= 438  ms  QTcB= 452  ms  QRS Axis= -33  deg  T Wave Axis= 104  deg  - ABNORMAL ECG -  Sinus rhythm  Vent pre-excitat'n(WPW), left acces'y pathway  When compared with ECG of 24-Sep-2023 13:19:10,  Significant rate decrease  Electronically Signed By:   Date and Time of Study: 2023-09-25 05:43:19            Imaging Results (Last 24 Hours)       ** No results found for the last 24 hours. **          No results found for this or any previous visit.     Results for orders placed during the hospital encounter of 09/24/23    ADULT TRANSTHORACIC ECHO COMPLETE W/ CONT IF NECESSARY PER PROTOCOL    Interpretation Summary    Left ventricular systolic function is moderately decreased. Left ventricular ejection fraction appears to be 41 - 45%.     Lab Results   Component Value Date    GLUCOSE 99 09/26/2023    BUN 14 09/26/2023    CREATININE 0.91 09/26/2023    EGFR 117.0 09/26/2023    BCR 15.4 09/26/2023    K 3.9 09/26/2023    CO2 24.0 09/26/2023    CALCIUM 9.1 09/26/2023    ALBUMIN 3.9 09/26/2023    BILITOT 0.7 09/26/2023    AST 14 09/26/2023    ALT 10 09/26/2023      Lab Results   Component Value Date    CHOL 157 09/24/2023    TRIG 105 09/24/2023    HDL 41 09/24/2023    LDL 97 09/24/2023      Lab Results   Component Value Date    TROPONINT <6 09/24/2023        Assessment & Plan       Mark-Parkinson-White syndrome    Atrial fibrillation with RVR        Susy Crenshaw MD  09/26/23  11:31 EDT

## 2023-09-26 NOTE — PLAN OF CARE
Problem: Adult Inpatient Plan of Care  Goal: Plan of Care Review  Outcome: Ongoing, Progressing  Goal: Patient-Specific Goal (Individualized)  Outcome: Ongoing, Progressing  Goal: Absence of Hospital-Acquired Illness or Injury  Outcome: Ongoing, Progressing  Intervention: Identify and Manage Fall Risk  Recent Flowsheet Documentation  Taken 9/26/2023 0400 by Ruchi Birch RN  Safety Promotion/Fall Prevention: safety round/check completed  Taken 9/26/2023 0200 by Ruchi Birch RN  Safety Promotion/Fall Prevention: safety round/check completed  Taken 9/26/2023 0000 by Ruchi Birch RN  Safety Promotion/Fall Prevention: safety round/check completed  Taken 9/25/2023 2200 by Ruchi Birch RN  Safety Promotion/Fall Prevention: safety round/check completed  Taken 9/25/2023 2000 by Ruchi Birch RN  Safety Promotion/Fall Prevention: safety round/check completed  Intervention: Prevent and Manage VTE (Venous Thromboembolism) Risk  Recent Flowsheet Documentation  Taken 9/26/2023 0000 by Ruchi Birch RN  VTE Prevention/Management: sequential compression devices on  Taken 9/25/2023 2000 by Ruchi Birch RN  VTE Prevention/Management: sequential compression devices off  Intervention: Prevent Infection  Recent Flowsheet Documentation  Taken 9/26/2023 0000 by Ruchi Birch RN  Infection Prevention: single patient room provided  Taken 9/25/2023 2000 by Ruchi Birch RN  Infection Prevention: single patient room provided  Goal: Optimal Comfort and Wellbeing  Outcome: Ongoing, Progressing  Intervention: Provide Person-Centered Care  Recent Flowsheet Documentation  Taken 9/26/2023 0000 by Ruchi Birch RN  Trust Relationship/Rapport: care explained  Taken 9/25/2023 2000 by Ruchi Birch RN  Trust Relationship/Rapport: care explained  Goal: Readiness for Transition of Care  Outcome: Ongoing, Progressing  Goal: Plan of Care Review  Outcome: Ongoing, Progressing  Goal:  Patient-Specific Goal (Individualized)  Outcome: Ongoing, Progressing  Goal: Absence of Hospital-Acquired Illness or Injury  Outcome: Ongoing, Progressing  Intervention: Identify and Manage Fall Risk  Recent Flowsheet Documentation  Taken 9/26/2023 0400 by Ruchi Birch RN  Safety Promotion/Fall Prevention: safety round/check completed  Taken 9/26/2023 0200 by Ruchi Birch RN  Safety Promotion/Fall Prevention: safety round/check completed  Taken 9/26/2023 0000 by Ruchi Birch RN  Safety Promotion/Fall Prevention: safety round/check completed  Taken 9/25/2023 2200 by Ruchi Birch RN  Safety Promotion/Fall Prevention: safety round/check completed  Taken 9/25/2023 2000 by Ruchi Birch RN  Safety Promotion/Fall Prevention: safety round/check completed  Intervention: Prevent and Manage VTE (Venous Thromboembolism) Risk  Recent Flowsheet Documentation  Taken 9/26/2023 0000 by Ruchi Birch RN  VTE Prevention/Management: sequential compression devices on  Taken 9/25/2023 2000 by Ruchi Birch RN  VTE Prevention/Management: sequential compression devices off  Intervention: Prevent Infection  Recent Flowsheet Documentation  Taken 9/26/2023 0000 by Ruchi Birch RN  Infection Prevention: single patient room provided  Taken 9/25/2023 2000 by Ruchi Birch RN  Infection Prevention: single patient room provided  Goal: Optimal Comfort and Wellbeing  Outcome: Ongoing, Progressing  Intervention: Provide Person-Centered Care  Recent Flowsheet Documentation  Taken 9/26/2023 0000 by Ruchi Birch RN  Trust Relationship/Rapport: care explained  Taken 9/25/2023 2000 by Ruchi Birch RN  Trust Relationship/Rapport: care explained  Goal: Readiness for Transition of Care  Outcome: Ongoing, Progressing     Problem: Skin Injury Risk Increased  Goal: Skin Health and Integrity  Outcome: Ongoing, Progressing   Goal Outcome Evaluation:         Patient NPO and scheduled to have  ablation this AM with Dr Noriega

## 2023-09-26 NOTE — ANESTHESIA POSTPROCEDURE EVALUATION
Patient: Dieter Daugherty    Procedure Summary       Date: 09/26/23 Room / Location: [place] CATH LAB 3 [facility] CATH INVASIVE LOCATION    Anesthesia Start: 0831 Anesthesia Stop: 1136    Procedure: Ablation SVT (Right: Groin) Diagnosis:       Mark-Parkinson-White syndrome      Atrial fibrillation with RVR      (wpw)    Providers: Reggie Noriega MD Provider: June Starr MD    Anesthesia Type: general, MAC ASA Status: 3            Anesthesia Type: general, MAC    Vitals  Vitals Value Taken Time   /71 09/26/23 1301   Temp 96.9 °F (36.1 °C) 09/26/23 1145   Pulse 74 09/26/23 1319   Resp 21 09/26/23 1145   SpO2 96 % 09/26/23 1319   Vitals shown include unvalidated device data.        Post Anesthesia Care and Evaluation    Patient location during evaluation: PACU  Patient participation: complete - patient participated  Level of consciousness: awake and alert  Pain management: satisfactory to patient    Airway patency: patent  Anesthetic complications: No anesthetic complications  PONV Status: none  Cardiovascular status: acceptable  Respiratory status: acceptable  Hydration status: acceptable

## 2023-09-26 NOTE — PROGRESS NOTES
Patient had a left posteroseptal pathway ablation with Mark-Parkinson-White syndrome with complete loss of antegrade and retrograde conduction via pathway post ablation.    However patient was noted to have recurrent delta wave and ECG was done which has confirmed of recurrence of antegrade conduction.    I have discussed the EKG findings with the patient and family    Patient to be started on beta-blockers  Follow-up as an outpatient    We will likely end up needing a repeat EP study to evaluate pathway conduction        Electronically signed by Reggie Noriega MD, 09/26/23, 4:55 PM EDT.

## 2023-09-26 NOTE — PLAN OF CARE
Plan is for patient to come back for another ablation as delta wave is back. Stopcock removed at 1515 and patient's bedrest was up at 1615. Patient walked around the unit and groin site is soft and nontender with minimal drainage. Toprol XL was started and patient given specific instructions on when to hold medication. Girlfriend at bedside and involved in communication. Patient to leave via wheelchair.

## 2023-09-26 NOTE — DISCHARGE SUMMARY
/             [facility] Medicine Services  DISCHARGE SUMMARY    Patient Name: Dieter Daugherty  : 1995  MRN: [id number]    Date of Admission: 2023  Discharge Diagnosis: Mark-Parkinson-White syndrome with preexcitation  atrial fibrillation with extreme rapid conduction needing external cardioversion  Date of Discharge:  23  Primary Care Physician: Provider, No Known      Presenting Problem:   Ventricular tachycardia [I47.20]  V-tach [I47.20]    Active and Resolved Hospital Problems:  Active Hospital Problems    Diagnosis POA    Atrial fibrillation with RVR [I48.91] Unknown    Mark-Parkinson-White syndrome [I45.6] Unknown      Resolved Hospital Problems    Diagnosis POA    Ventricular tachycardia [I47.20] Yes       Tachyarrhythmia/ Atrial fibrillation with rapid ventricular response unresponsive to pharmacotherapy  Impending shock/hemodynamic instability    - Mark-Parkinson-White  -Magnesium level 2.3  -Echo- Left ventricular systolic function is moderately decreased. Left ventricular ejection fraction appears to be 41 - 45%.  - procainamide infusion dc  -s/p cardioversion  -Cardiology consulted:The patient was attached to appropriate monitoring equipment as well as defibrillator pads. After adequate IV sedation was achieved, a single 120 J shock was delivered. This was unsuccessful at cardioversion. Further IV sedation was administered. Energy was increased to 200 J. This converted the patient to sinus rhythm     Hospital Course     Hospital Course:  Dieter Daugherty is a 28 y.o. male  with no significant PMH presented to the hospital for palpitations, and was admitted with a principal diagnosis of Ventricular tachycardia.  Patient states he was at a wedding earlier today and had a few alcoholic beverages.  He states once he arrived home after the party he noticed long periods of palpitations.  He states when he was approximately 14 years old that he had a similar problem where  he was told he had irregular heart rhythms.  He states he does not remember an actual name of the condition and the palpitations resolved so he did not continue to follow-up.  He states occasionally since then he would have intermittent episodes of palpitations but denies any chest pain.  At the time my assessment, patient denies chest pain, shortness of breath, syncope, or lightheadedness.  He states he can feel the palpitations when his heart rate gets approximately 200 bpm.  Of note, on 3/25/2014 there is a [facility] office visit note discussing Mark-Parkinson-White. I admitted patient to the intensive care unit for further evaluation and treatment.     ACP: Patient wishes to be full code with full intervention; his mother is his decision-maker if he is unable.     Patient was seen and examined on 09/24/23 at 04:45 EDT .    /9/26/23 seen and examined in bed no acute distress, no new complaints, patient underwent, EP study and ablation because of high risk because of rapidly conducted AF   Patient to bed today, he was cleared for discharge by cardiology.  Will discharge patient home, condition at discharge stable.    DISCHARGE Follow Up Recommendations for labs and diagnostics: With PCP in a week.  Follow-up with cardiology in 2 weeks.    Reasons For Change In Medications and Indications for New Medications:ASA/toprol    Day of Discharge     Vital Signs:  Temp:  [96.9 °F (36.1 °C)-98.5 °F (36.9 °C)] 96.9 °F (36.1 °C)  Heart Rate:  [65-91] 84  Resp:  [16-21] 21  BP: (106-123)/(55-78) 123/76      Physical Exam Constitutional:       Appearance: Normal appearance.   HENT:      Head: Normocephalic.      Nose: Nose normal.      Mouth/Throat:      Mouth: Mucous membranes are moist.      Pharynx: Oropharynx is clear.   Eyes:      Pupils: Pupils are equal, round, and reactive to light.   Cardiovascular:      Rate and Rhythm: Tachycardia present. Rhythm irregular.      Pulses: Normal pulses.      Heart sounds:  Normal heart sounds.   Pulmonary:      Effort: Pulmonary effort is normal.      Breath sounds: Normal breath sounds.   Abdominal:      General: Bowel sounds are normal.      Palpations: Abdomen is soft.   Musculoskeletal:         General: Normal range of motion.      Cervical back: Normal range of motion.   Skin:     General: Skin is warm and dry.      Capillary Refill: Capillary refill takes 2 to 3 seconds.   Neurological:      Mental Status: He is alert and oriented to person, place, and time. Mental status is at baseline.   Psychiatric:         Mood and Affect: Mood normal.         Behavior: Behavior normal.         Thought Content: Thought content normal.         Judgment: Judgment normal.       Pertinent  and/or Most Recent Results     LAB RESULTS:      Lab 09/26/23  0441 09/25/23  0416 09/24/23  0429 09/24/23  0035 09/24/23  0021   WBC 5.90 5.40 6.80  --  8.40   HEMOGLOBIN 15.0 15.0 15.6  --  16.7   HEMOGLOBIN, POC  --   --   --  15.6  --    HEMATOCRIT 42.3 42.2 44.4  --  46.7   HEMATOCRIT POC  --   --   --  46  --    PLATELETS 165 181 213  --  253   NEUTROS ABS 3.80 3.30 4.10  --  5.30   LYMPHS ABS 1.30 1.50 2.10  --  2.10   MONOS ABS 0.50 0.40 0.50  --  0.90   EOS ABS 0.20 0.20 0.00  --  0.10   MCV 89.7 89.3 89.4  --  89.1   PROTIME  --   --   --   --  10.3   APTT  --   --   --   --  27.2*         Lab 09/26/23  0441 09/25/23  0416 09/24/23  0842 09/24/23  0035 09/24/23  0021   SODIUM 141 140 141  --  142   POTASSIUM 3.9 4.1 4.3  --  4.0   CHLORIDE 106 107 109*  --  105   CO2 24.0 25.0 20.0*  --  22.0   POC ANION GAP ISTAT  --   --   --  17.0  --    ANION GAP 11.0 8.0 12.0  --  15.0   BUN 14 13 15  --  17   CREATININE 0.91 0.97 0.88 1.60* 1.23   EGFR 117.0 108.4 119.4 59.4* 81.5   GLUCOSE 99 105* 101*  --  110*   CALCIUM 9.1 8.9 9.1  --  9.6   MAGNESIUM 2.0 2.0 2.0  --  2.3   TSH  --   --   --   --  2.080         Lab 09/26/23  0441 09/25/23  0416 09/24/23  0842 09/24/23  0021   TOTAL PROTEIN 5.8* 5.7* 6.3 7.2    ALBUMIN 3.9 3.7 4.2 4.9   GLOBULIN 1.9 2.0 2.1 2.3   ALT (SGPT) 10 11 17 17   AST (SGOT) 14 14 23 21   BILIRUBIN 0.7 0.8 1.2 0.4   ALK PHOS 65 53 59 77         Lab 09/24/23  0021   HSTROP T <6   PROTIME 10.3   INR 0.96         Lab 09/24/23  0429   CHOLESTEROL 157   LDL CHOL 97   HDL CHOL 41   TRIGLYCERIDES 105             Brief Urine Lab Results       None          Microbiology Results (last 10 days)       ** No results found for the last 240 hours. **            XR Chest 1 View    Result Date: 9/24/2023  Impression: Impression: No acute cardiopulmonary process. Electronically Signed: Cyn Castro MD  9/24/2023 12:52 AM EDT  Workstation ID: [id number]             Results for orders placed during the hospital encounter of 09/24/23    ADULT TRANSTHORACIC ECHO COMPLETE W/ CONT IF NECESSARY PER PROTOCOL    Interpretation Summary    Left ventricular systolic function is moderately decreased. Left ventricular ejection fraction appears to be 41 - 45%.      Labs Pending at Discharge:      Procedures Performed  Procedure(s):  Ablation SVT  09/24 1200 ELECTRICAL CARDIOVERSION      Consults:   Consults       Date and Time Order Name Status Description    9/24/2023  4:10 PM Inpatient Hospitalist Consult      9/24/2023  2:26 AM Inpatient Intensivist Consult      9/24/2023 12:36 AM Inpatient Cardiology Consult Completed           IMPRESSIONS  Atrial fibrillation with rapid ventricular response with preexcitation  Alcohol intoxication  Preexcitation  WPW syndrome  Atrial fibrillation with preexcitation requiring emergent a DC cardioversion  Presentation EKG shows atrial fibrillation with preexcitation with rapid ventricular response with a ventricular rate close to 200 bpm     RECOMMENDATIONS:  Rate controlling pharmacotherapy proved to be ineffective and the patient ultimately underwent DC cardioversion at the bedside.  Further recommendations as patient's course progresses  Discontinue IV amiodarone  EP evaluation for  consideration for EP study and possible ablation for accessory pathway  Echocardiogram with LV ejection fraction of 40 to 45%  LV dysfunction could be driven by tachycardia  Further recommendations based on patient course       Assessment plan     Mark-Parkinson-White syndrome with preexcitation  atrial fibrillation with extreme rapid conduction needing external cardioversion  Recent alcohol intoxication     IV amiodarone has been stopped  Patient to be scheduled for EP study and ablation because of high risk because of rapidly conducted AF  Patient and family educated  Risks and benefits outcomes educated  Orders for EP study and ablation placed for tomorrow        Electronically signed by Reggie Noriega MD, 09/25/23, 7:02 PM EDT    Discharge Details        Discharge Medications        New Medications        Instructions Start Date   aspirin 325 MG EC tablet  Commonly known as: Ecotrin   325 mg, Oral, Daily      metoprolol succinate XL 50 MG 24 hr tablet  Commonly known as: TOPROL-XL   50 mg, Oral, Every 24 Hours Scheduled   Start Date: September 27, 2023              Allergies   Allergen Reactions    Azithromycin Tinnitus         Discharge Disposition:   Home or Self Care    Diet:  Hospital:  Diet Order   Procedures    Diet: Regular/House Diet; Texture: Regular Texture (IDDSI 7); Fluid Consistency: Thin (IDDSI 0)         Discharge Activity:   Activity Instructions       Gradually Increase Activity Until at Pre-Hospitalization Level                CODE STATUS:  Code Status and Medical Interventions:   Ordered at: 09/24/23 0242     Code Status (Patient has no pulse and is not breathing):    CPR (Attempt to Resuscitate)     Medical Interventions (Patient has pulse or is breathing):    Full Support     I have utilized all available, immediate resources to obtain, update, or review the patient's current medications including all prescriptions, over-the-counter products, herbals, cannabis/cannabidiol products, and  vitamin.mineral/dietary (nutritional) supplements.      Code Status (Patient has no pulse and is not breathing): CPR (Attempt to Resuscitate)  Medical Interventions (Patient has pulse or is breathing): Full Support          Admission Status:  I believe this patient meets INPATIENT status.            No future appointments.    Additional Instructions for the Follow-ups that You Need to Schedule       Discharge Follow-up with PCP   As directed       Currently Documented PCP:    Provider, No Known    PCP Phone Number:    None     Follow Up Details: 1WEEK        Discharge Follow-up with Specified Provider: CARDIOLOGY; 2 Weeks   As directed      To: CARDIOLOGY   Follow Up: 2 Weeks        Discharge Follow-up with Specified Provider: Leann; 3 Weeks   As directed      To: Leann   Follow Up: 3 Weeks                Time spent on Discharge including face to face service:  34 minutes    This patient has been examined wearing appropriate Personal Protective Equipment and discussed with  rn . 09/26/23      Signature: Electronically signed by Bhavin Berg MD, 09/26/23, 3:03 PM EDT.

## 2023-09-27 ENCOUNTER — TELEPHONE (OUTPATIENT)
Dept: CARDIOLOGY | Facility: CLINIC | Age: 28
End: 2023-09-27
Payer: COMMERCIAL

## 2023-09-27 NOTE — CASE MANAGEMENT/SOCIAL WORK
Case Management Discharge Note      Final Note: home    Provided Post Acute Provider List?: N/A  N/A Provider List Comment: denies dc needs  Provided Post Acute Provider Quality & Resource List?: N/A    Selected Continued Care - Discharged on 9/26/2023 Admission date: 9/24/2023 - Discharge disposition: Home or Self Care             Transportation Services  Private: Car    Final Discharge Disposition Code: 01 - home or self-care

## 2023-09-27 NOTE — TELEPHONE ENCOUNTER
Caller: Dieter Daugherty    Relationship: Self    Best call back number: [phone]    Who is your current provider: DR BRAUN    Who would you like your new provider to be: DR BELL     What are your reasons for transferring care: PATIENT DID NOT HAVE A SUCCESSFUL ABLATION NOR PROPER EXPLANATION FOR PLAN OF CARE WAS NOT GIVEN TO GIRLFRIEND.    Additional notes: PATIENT NEEDS TO BE SCHEDULED FOR SECOND ABLATION.

## 2023-10-13 LAB
QT INTERVAL: 344 MS
QT INTERVAL: 395 MS
QT INTERVAL: 399 MS
QTC INTERVAL: 450 MS
QTC INTERVAL: 476 MS
QTC INTERVAL: 562 MS

## 2023-10-14 LAB
QT INTERVAL: 438 MS
QTC INTERVAL: 452 MS

## 2023-10-18 ENCOUNTER — OFFICE VISIT (OUTPATIENT)
Dept: CARDIOLOGY | Facility: CLINIC | Age: 28
End: 2023-10-18
Payer: COMMERCIAL

## 2023-10-18 VITALS
SYSTOLIC BLOOD PRESSURE: 118 MMHG | HEART RATE: 76 BPM | HEIGHT: 75 IN | DIASTOLIC BLOOD PRESSURE: 82 MMHG | OXYGEN SATURATION: 98 % | WEIGHT: 281.75 LBS | BODY MASS INDEX: 35.03 KG/M2

## 2023-10-18 DIAGNOSIS — I48.91 ATRIAL FIBRILLATION WITH RVR: ICD-10-CM

## 2023-10-18 DIAGNOSIS — I45.6 WOLFF-PARKINSON-WHITE SYNDROME: Primary | ICD-10-CM

## 2023-10-18 NOTE — PROGRESS NOTES
HP      Name: Dieter Daugherty ADMIT: (Not on file)   : 1995  PCP: Provider, No Known    MRN: [id number] LOS: 0 days   AGE/SEX: 28 y.o. male  ROOM: Room/bed info not found     Chief Complaint   Patient presents with    Follow-up     Discuss possible ablation       Subjective        History of present illness  Dieter Daugherty is a 28-year-old male patient who has history of WPW diagnosed during his teens, patient was admitted to the hospital in 2023 with rapid heartbeat, he was at a wedding and he had been consuming alcohol.  He was found to be in A-fib with rapid ventricular rate with ventricular preexcitation from his WPW.  He did undergo EP study with ablation of the left posteroseptal pathway by Dr. Noriega.  Acutely the pathway had disappeared, however unfortunately the next day delta wave was apparent again on EKG.  After the discharge, patient went to see Dr. Stinson who recommended redo ablation and he is scheduled to have second ablation at [facility].  He is here today mainly to have a second opinion whether he needs another ablation or not.    Past Medical History:   Diagnosis Date    Mark-Parkinson-White syndrome 2014     Past Surgical History:   Procedure Laterality Date    CARDIAC ELECTROPHYSIOLOGY PROCEDURE Right 2023    Procedure: Ablation SVT;  Surgeon: Reggie Noriega MD;  Location: [facility] INVASIVE LOCATION;  Service: Cardiovascular;  Laterality: Right;    ELECTRICAL CARDIOVERSION  2023     Family History   Problem Relation Age of Onset    No Known Problems Mother     No Known Problems Father      Social History     Tobacco Use    Smoking status: Never     Passive exposure: Never    Smokeless tobacco: Never   Vaping Use    Vaping Use: Never used   Substance Use Topics    Alcohol use: Never    Drug use: Never     (Not in a hospital admission)    Allergies:  Azithromycin    Review of systems    Constitutional: Negative.    Respiratory and cardiovascular: As  detailed in HPI section.  Gastrointestinal: Negative for constipation, nausea and vomiting negative for abdominal distention, abdominal pain and diarrhea.   Genitourinary: Negative for difficulty urinating and flank pain.   Musculoskeletal: Negative for arthralgias, joint swelling and myalgias.   Skin: Negative for color change, rash and wound.   Neurological: Negative for dizziness, syncope, weakness and headaches.   Hematological: Negative for adenopathy.   Psychiatric/Behavioral: Negative for confusion.   All other systems reviewed and are negative.    Physical Exam  VITALS REVIEWED    General:      well developed, in no acute distress.    Head:      normocephalic and atraumatic.    Eyes:      PERRL/EOM intact, conjunctiva and sclera clear with out nystagmus.    Neck:      no masses, thyromegaly,  trachea central with normal respiratory effort and PMI displaced laterally  Lungs:      Clear to auscultation bilaterally  Heart:       Regular rate and rhythm  Msk:      no deformity or scoliosis noted of thoracic or lumbar spine.    Pulses:      pulses normal in all 4 extremities.    Extremities:       No lower extremity edema  Neurologic:      no focal deficits.   alert oriented x3  Skin:      intact without lesions or rashes.    Psych:      alert and cooperative; normal mood and affect; normal attention span and concentration.      Result Review :               Pertinent cardiac workup    EKG 9/24/2023 atrial fibrillation with ventricular preexcitation, ventricular rate of 198 bpm.  EKG 9/24/2023 sinus rhythm with ventricular preexcitation/WPW      Procedures        Assessment and Plan      Dieter Daugherty is a 28-year-old male patient who has Mark-Parkinson-White syndrome, he did develop atrial fibrillation with rapid ventricular rates with ventricular preexcitation on 9/24/2023.  He was first cardioverted and then EP study with ablation of the posteroseptal pathway was performed by Dr. Noriega.  Unfortunately  the next day the delta wave reappeared and the patient after discharge saw Dr. Stinson who recommended a redo ablation.  He is here today mainly to have a second opinion whether he needs another ablation or not.  I reviewed the EKGs in the chart and the EKG post ablation does show the same delta wave.  I think that the patient does need a second ablation because of persistence of the pathway and the fact that we know it is an active pathway able to conduct fast.  I advised him to carry through with the procedure which is already scheduled by Dr. Leon.      Diagnoses and all orders for this visit:    1. Mark-Parkinson-White syndrome (Primary)    2. Atrial fibrillation with RVR  Overview:  Added automatically from request for surgery [id number]             Return if symptoms worsen or fail to improve.  Patient was given instructions and counseling regarding his condition or for health maintenance advice. Please see specific information pulled into the AVS if appropriate.

## 2024-08-10 ENCOUNTER — HOSPITAL ENCOUNTER (EMERGENCY)
Facility: HOSPITAL | Age: 29
Discharge: HOME | End: 2024-08-10
Attending: EMERGENCY MEDICINE
Payer: COMMERCIAL

## 2024-08-10 VITALS
SYSTOLIC BLOOD PRESSURE: 135 MMHG | DIASTOLIC BLOOD PRESSURE: 86 MMHG | HEIGHT: 75 IN | OXYGEN SATURATION: 98 % | HEART RATE: 81 BPM | RESPIRATION RATE: 18 BRPM | TEMPERATURE: 98.3 F | WEIGHT: 270 LBS | BODY MASS INDEX: 33.57 KG/M2

## 2024-08-10 DIAGNOSIS — T63.484A INSECT STINGS, UNDETERMINED INTENT, INITIAL ENCOUNTER: Primary | ICD-10-CM

## 2024-08-10 DIAGNOSIS — R21 RASH: ICD-10-CM

## 2024-08-10 PROCEDURE — 99283 EMERGENCY DEPT VISIT LOW MDM: CPT

## 2024-08-10 PROCEDURE — 63710000001 PREDNISONE PER 5 MG: Performed by: NURSE PRACTITIONER

## 2024-08-10 RX ORDER — PREDNISONE 20 MG/1
60 TABLET ORAL DAILY
Qty: 12 TABLET | Refills: 0 | Status: SHIPPED | OUTPATIENT
Start: 2024-08-10 | End: 2024-08-14

## 2024-08-10 RX ORDER — CEPHALEXIN 500 MG/1
500 CAPSULE ORAL 4 TIMES DAILY
Qty: 28 CAPSULE | Refills: 0 | Status: SHIPPED | OUTPATIENT
Start: 2024-08-10 | End: 2024-08-17

## 2024-08-10 RX ORDER — CEPHALEXIN 500 MG/1
500 CAPSULE ORAL ONCE
Status: COMPLETED | OUTPATIENT
Start: 2024-08-10 | End: 2024-08-10

## 2024-08-10 RX ORDER — TRIAMCINOLONE ACETONIDE 1 MG/G
1 CREAM TOPICAL 3 TIMES DAILY
Qty: 15 G | Refills: 0 | Status: SHIPPED | OUTPATIENT
Start: 2024-08-10 | End: 2024-08-15

## 2024-08-10 RX ORDER — EPINEPHRINE 0.3 MG/.3ML
0.3 INJECTION SUBCUTANEOUS ONCE
Qty: 1 EACH | Refills: 0 | Status: SHIPPED | OUTPATIENT
Start: 2024-08-10 | End: 2024-08-10

## 2024-08-10 RX ADMIN — CEPHALEXIN 500 MG: 500 CAPSULE ORAL at 22:09

## 2024-08-10 RX ADMIN — PREDNISONE 60 MG: 50 TABLET ORAL at 22:10
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
## 2024-08-11 NOTE — DISCHARGE INSTRUCTIONS
Please follow-up for recheck, call for an appointment  Take all antibiotics  Return to the ED for new or worsening symptoms

## 2025-08-11 ENCOUNTER — HOSPITAL ENCOUNTER (EMERGENCY)
Facility: HOSPITAL | Age: 30
Discharge: HOME | End: 2025-08-11
Attending: EMERGENCY MEDICINE | Admitting: EMERGENCY MEDICINE
Payer: COMMERCIAL

## 2025-08-11 VITALS
HEART RATE: 83 BPM | DIASTOLIC BLOOD PRESSURE: 61 MMHG | RESPIRATION RATE: 24 BRPM | TEMPERATURE: 97.6 F | BODY MASS INDEX: 37.18 KG/M2 | SYSTOLIC BLOOD PRESSURE: 116 MMHG | WEIGHT: 299 LBS | OXYGEN SATURATION: 94 % | HEIGHT: 75 IN

## 2025-08-11 DIAGNOSIS — T78.40XA ALLERGIC REACTION, INITIAL ENCOUNTER: Primary | ICD-10-CM

## 2025-08-11 PROCEDURE — 25010000002 METHYLPREDNISOLONE PER 125 MG: Performed by: EMERGENCY MEDICINE

## 2025-08-11 PROCEDURE — 96375 TX/PRO/DX INJ NEW DRUG ADDON: CPT

## 2025-08-11 PROCEDURE — 25010000002 EPINEPHRINE (ANAPHYLAXIS) 1 MG/ML SOLUTION: Performed by: EMERGENCY MEDICINE

## 2025-08-11 PROCEDURE — 99283 EMERGENCY DEPT VISIT LOW MDM: CPT

## 2025-08-11 PROCEDURE — 25010000002 DIPHENHYDRAMINE PER 50 MG: Performed by: EMERGENCY MEDICINE

## 2025-08-11 PROCEDURE — 96372 THER/PROPH/DIAG INJ SC/IM: CPT

## 2025-08-11 PROCEDURE — 96374 THER/PROPH/DIAG INJ IV PUSH: CPT

## 2025-08-11 PROCEDURE — 25010000002 FAMOTIDINE 10 MG/ML SOLUTION: Performed by: EMERGENCY MEDICINE

## 2025-08-11 RX ORDER — FAMOTIDINE 10 MG/ML
20 INJECTION, SOLUTION INTRAVENOUS ONCE
Status: COMPLETED | OUTPATIENT
Start: 2025-08-11 | End: 2025-08-11

## 2025-08-11 RX ORDER — METHYLPREDNISOLONE SODIUM SUCCINATE 125 MG/2ML
125 INJECTION, POWDER, LYOPHILIZED, FOR SOLUTION INTRAMUSCULAR; INTRAVENOUS ONCE
Status: COMPLETED | OUTPATIENT
Start: 2025-08-11 | End: 2025-08-11

## 2025-08-11 RX ORDER — DIPHENHYDRAMINE HYDROCHLORIDE 50 MG/ML
25 INJECTION, SOLUTION INTRAMUSCULAR; INTRAVENOUS ONCE
Status: COMPLETED | OUTPATIENT
Start: 2025-08-11 | End: 2025-08-11

## 2025-08-11 RX ORDER — PREDNISONE 20 MG/1
20 TABLET ORAL 2 TIMES DAILY
Qty: 10 TABLET | Refills: 0 | Status: SHIPPED | OUTPATIENT
Start: 2025-08-11

## 2025-08-11 RX ORDER — EPINEPHRINE 1 MG/ML
0.3 INJECTION, SOLUTION INTRAMUSCULAR; SUBCUTANEOUS ONCE
Status: COMPLETED | OUTPATIENT
Start: 2025-08-11 | End: 2025-08-11

## 2025-08-11 RX ADMIN — DIPHENHYDRAMINE HYDROCHLORIDE 25 MG: 50 INJECTION INTRAMUSCULAR; INTRAVENOUS at 02:03

## 2025-08-11 RX ADMIN — METHYLPREDNISOLONE SODIUM SUCCINATE 125 MG: 125 INJECTION, POWDER, FOR SOLUTION INTRAMUSCULAR; INTRAVENOUS at 02:03

## 2025-08-11 RX ADMIN — EPINEPHRINE 0.3 MG: 1 INJECTION INTRAMUSCULAR; INTRAVENOUS; SUBCUTANEOUS at 01:58

## 2025-08-11 RX ADMIN — FAMOTIDINE 20 MG: 10 INJECTION INTRAVENOUS at 02:03

## (undated) DEVICE — Device: Brand: REFERENCE PATCH CARTO 3

## (undated) DEVICE — Device: Brand: THERMOCOOL SMARTTOUCH SF

## (undated) DEVICE — PROVE COVER: Brand: UNBRANDED

## (undated) DEVICE — PINNACLE INTRODUCER SHEATH: Brand: PINNACLE

## (undated) DEVICE — ELECTRD DEFIB M/FUNC PROPADZ RADIOL 2PK

## (undated) DEVICE — LN INJ CONTRST FLXCIL HP F/M LL 1200PSI72

## (undated) DEVICE — Device: Brand: CARTO 3

## (undated) DEVICE — Device: Brand: WEBSTER CS

## (undated) DEVICE — ST ACC MICROPUNCTURE STFF/CANN PLAT/TP 4F 21G 40CM

## (undated) DEVICE — Device: Brand: SMARTABLATE

## (undated) DEVICE — PK TRY HEART CATH 50